# Patient Record
Sex: FEMALE | Race: WHITE | NOT HISPANIC OR LATINO | Employment: FULL TIME | URBAN - METROPOLITAN AREA
[De-identification: names, ages, dates, MRNs, and addresses within clinical notes are randomized per-mention and may not be internally consistent; named-entity substitution may affect disease eponyms.]

---

## 2017-01-16 ENCOUNTER — ALLSCRIPTS OFFICE VISIT (OUTPATIENT)
Dept: OTHER | Facility: OTHER | Age: 22
End: 2017-01-16

## 2017-01-16 DIAGNOSIS — Z11.3 ENCOUNTER FOR SCREENING FOR INFECTIONS WITH PREDOMINANTLY SEXUAL MODE OF TRANSMISSION: ICD-10-CM

## 2017-07-17 ENCOUNTER — ALLSCRIPTS OFFICE VISIT (OUTPATIENT)
Dept: OTHER | Facility: OTHER | Age: 22
End: 2017-07-17

## 2018-01-13 NOTE — PROGRESS NOTES
Assessment    1  Encounter for preventive health examination (V70 0) (Z00 00)   2  Routine screening for STI (sexually transmitted infection) (V74 5) (Z11 3)    Plan  Routine screening for STI (sexually transmitted infection)    · (1) [address]; Status:Active; Requested [id number];    · (1) HEP C ANTIBODY; Status:Active; Requested [id number];    · (1) HIV AG/AB COMBO, 4TH GEN; [Do Not Release]; Status:Active; Requested  [id number];    · (1) RPR; Status:Active; Requested [id number];     Discussion/Summary  health maintenance visit Currently, she eats a healthy diet and has an inadequate exercise regimen  cervical cancer screening is not indicated Testing was done today for chlamydia, gonorrhea and HIV  Breast cancer screening: monthly self breast exam was advised  Colorectal cancer screening: colorectal cancer screening is not indicated  Osteoporosis screening: bone mineral density testing is not indicated  Advice and education were given regarding nutrition and reproductive health  Patient discussion: discussed with the patient  Pt is to have STI testing and see GYN at school  She is also given the option to follow up here  The patent has the current Barriers: None  Patient is able to Self-Care  Educational resources provided:   Possible side effects of new medications were reviewed with the patient/guardian today  The treatment plan was reviewed with the patient/guardian  The patient/guardian understands and agrees with the treatment plan   The patient was counseled regarding instructions for management, risk factor reductions, patient and family education, impressions, risks and benefits of treatment options, importance of compliance with treatment  Self Referrals: No      Chief Complaint  Pt  in office today for a check up  History of Present Illness  HM, Adult Female: The patient is being seen for a health maintenance evaluation     Social History: Household members include 2 son(s) and father  She is unmarried  Work status: student  The patient has never smoked cigarettes  She reports occasional alcohol use  She has never used illicit drugs  General Health: The patient's health since the last visit is described as good  She has regular dental visits  She complains of vision problems  Vision care includes wearing glasses and having regular eye examinations  She denies hearing loss  Lifestyle:  She consumes a diverse and healthy diet  She does not have any weight concerns  She does not exercise regularly  She does not use tobacco  She consumes alcohol  She denies drug use  Reproductive health: the patient is premenopausal   she reports abnormal menses  Menstrual history: LMP: the last menstrual period was 12/28/16  Recent menstrual periods: bleeding has been normal  The cycles have been regular  The duration of her recent periods has been regular  Screening:   HPI: Pt is here for a check up  She is sexually active and would like STI testing  She needs an exam and wants to start some form of birth control  She is leaving for school tomorrow  Review of Systems    Constitutional: No fever, no chills, feels well, no tiredness, no recent weight gain or weight loss  Eyes: No complaints of eye pain, no red eyes, no eyesight problems, no discharge, no dry eyes, no itching of eyes  ENT: no complaints of earache, no loss of hearing, no nose bleeds, no nasal discharge, no sore throat, no hoarseness  Cardiovascular: No complaints of slow heart rate, no fast heart rate, no chest pain, no palpitations, no leg claudication, no lower extremity edema  Respiratory: No complaints of shortness of breath, no wheezing, no cough, no SOB on exertion, no orthopnea, no PND  Gastrointestinal: No complaints of abdominal pain, no constipation, no nausea or vomiting, no diarrhea, no bloody stools     Genitourinary: No complaints of dysuria, no incontinence, no pelvic pain, no dysmenorrhea, no vaginal discharge or bleeding  Musculoskeletal: No complaints of arthralgias, no myalgias, no joint swelling or stiffness, no limb pain or swelling  Integumentary: No complaints of skin rash or lesions, no itching, no skin wounds, no breast pain or lump  Neurological: No complaints of headache, no confusion, no convulsions, no numbness, no dizziness or fainting, no tingling, no limb weakness, no difficulty walking  Psychiatric: Not suicidal, no sleep disturbance, no anxiety or depression, no change in personality, no emotional problems  Endocrine: No complaints of proptosis, no hot flashes, no muscle weakness, no deepening of the voice, no feelings of weakness  Hematologic/Lymphatic: No complaints of swollen glands, no swollen glands in the neck, does not bleed easily, does not bruise easily  ROS reviewed  Active Problems    1  Acne (706 1) (L70 9)   2  Cerumen impaction (380 4) (H61 20)   3  Dermatitis, contact (692 9) (L25 9)   4  's permit physical examination (V70 3) (Z02 4)   5   Need for diphtheria-tetanus-pertussis (Tdap) vaccine, adult/adolescent (V06 1) (Z23)    Past Medical History    · History of Concussion without loss of consciousness (850 0) (S06 0X0A)   · History of Dermatophytosis (110 9) (B35 9)   · History of Open scalp wound (873 0) (S01 00XA)   · History of Pneumonia (486) (J18 9)    Surgical History    · Denied: History Of Prior Surgery    Family History  Sister    · FHx: allergies (V19 6) (Z84 89)  Maternal Grandmother    · Family history of Hyperthyroidism  Paternal Grandmother    · Family history of Hypothyroidism  Paternal Grandfather    · Family history of CAD (coronary artery disease)   · Family history of S/P CABG x 4  Maternal Aunt    · Family history of Breast cancer  Maternal Uncle    · Family history of Bipolar disorder    Social History    · Denied: History of Alcohol use   · Denied: History of Drug use   · Never a smoker   · Never smoked    Current Meds   1  No Reported Medications Recorded    Allergies    1  No Known Drug Allergies    Vitals   Recorded: 11NPC7864 06:28PM   Heart Rate 86   Systolic 295   Diastolic 84   Height 5 ft 4 in   Weight 154 lb    BMI Calculated 26 43   BSA Calculated 1 76   O2 Saturation 98     Physical Exam    Constitutional   General appearance: No acute distress, well appearing and well nourished  Eyes   Conjunctiva and lids: No swelling, erythema or discharge  Pupils and irises: Equal, round and reactive to light  Ears, Nose, Mouth, and Throat   External inspection of ears and nose: Normal     Otoscopic examination: Tympanic membranes translucent with normal light reflex  Canals patent without erythema  Oropharynx: Normal with no erythema, edema, exudate or lesions  Pulmonary   Respiratory effort: No increased work of breathing or signs of respiratory distress  Auscultation of lungs: Clear to auscultation  Cardiovascular   Palpation of heart: Normal PMI, no thrills  Auscultation of heart: Normal rate and rhythm, normal S1 and S2, without murmurs  Examination of extremities for edema and/or varicosities: Normal     Abdomen   Abdomen: Non-tender, no masses  Liver and spleen: No hepatomegaly or splenomegaly  Lymphatic   Palpation of lymph nodes in neck: No lymphadenopathy  Musculoskeletal   Gait and station: Normal     Digits and nails: Normal without clubbing or cyanosis  Inspection/palpation of joints, bones, and muscles: Normal     Skin   Skin and subcutaneous tissue: Normal without rashes or lesions  Neurologic   Cranial nerves: Cranial nerves 2-12 intact  Reflexes: 2+ and symmetric  Sensation: No sensory loss      Psychiatric   Orientation to person, place, and time: Normal     Mood and affect: Normal        Signatures   Electronically signed by : YEE Alejandro; Jan 16 2017  6:59PM EST                       (Author)    Electronically signed by : Jerome Caicedo ELI Amado ; Jan 16 2017  7:04PM EST

## 2018-01-14 VITALS
HEART RATE: 113 BPM | SYSTOLIC BLOOD PRESSURE: 120 MMHG | WEIGHT: 149 LBS | OXYGEN SATURATION: 98 % | DIASTOLIC BLOOD PRESSURE: 80 MMHG | BODY MASS INDEX: 25.44 KG/M2 | HEIGHT: 64 IN

## 2018-01-22 VITALS
HEART RATE: 86 BPM | HEIGHT: 64 IN | WEIGHT: 154 LBS | BODY MASS INDEX: 26.29 KG/M2 | DIASTOLIC BLOOD PRESSURE: 84 MMHG | OXYGEN SATURATION: 98 % | SYSTOLIC BLOOD PRESSURE: 122 MMHG

## 2019-07-09 ENCOUNTER — OFFICE VISIT (OUTPATIENT)
Dept: OBGYN CLINIC | Age: 24
End: 2019-07-09
Payer: COMMERCIAL

## 2019-07-09 VITALS
BODY MASS INDEX: 25.69 KG/M2 | WEIGHT: 145 LBS | DIASTOLIC BLOOD PRESSURE: 70 MMHG | SYSTOLIC BLOOD PRESSURE: 112 MMHG | HEIGHT: 63 IN

## 2019-07-09 DIAGNOSIS — Z11.3 SCREENING FOR STDS (SEXUALLY TRANSMITTED DISEASES): ICD-10-CM

## 2019-07-09 DIAGNOSIS — L70.0 ACNE VULGARIS: ICD-10-CM

## 2019-07-09 DIAGNOSIS — Z23 NEED FOR HPV VACCINATION: ICD-10-CM

## 2019-07-09 DIAGNOSIS — Z01.419 ENCOUNTER FOR GYNECOLOGICAL EXAMINATION WITHOUT ABNORMAL FINDING: Primary | ICD-10-CM

## 2019-07-09 PROCEDURE — G0145 SCR C/V CYTO,THINLAYER,RESCR: HCPCS | Performed by: NURSE PRACTITIONER

## 2019-07-09 PROCEDURE — 99385 PREV VISIT NEW AGE 18-39: CPT | Performed by: NURSE PRACTITIONER

## 2019-07-09 PROCEDURE — 87591 N.GONORRHOEAE DNA AMP PROB: CPT | Performed by: NURSE PRACTITIONER

## 2019-07-09 PROCEDURE — 87491 CHLMYD TRACH DNA AMP PROBE: CPT | Performed by: NURSE PRACTITIONER

## 2019-07-09 RX ORDER — TRAZODONE HYDROCHLORIDE 50 MG/1
TABLET ORAL
Refills: 0 | COMMUNITY
Start: 2019-07-01 | End: 2019-07-23 | Stop reason: SDUPTHER

## 2019-07-09 RX ORDER — SERTRALINE HYDROCHLORIDE 25 MG/1
25 TABLET, FILM COATED ORAL EVERY MORNING
Refills: 0 | COMMUNITY
Start: 2019-06-04 | End: 2019-07-23 | Stop reason: SDUPTHER

## 2019-07-09 NOTE — PATIENT INSTRUCTIONS
HPV (Human Papillomavirus) Vaccine for Adults   AMBULATORY CARE:   The human papillomavirus (HPV) vaccine  is an injection given to females and males to protect against human papillomavirus infection  HPV is most commonly spread through sexual activity  It can also be spread from a mother to her baby during delivery  The HPV vaccine is most effective if given before sexual activity begins  This allows your body to build almost complete protection against HPV before you have contact with the virus  The HPV vaccine is still effective through the age of 32 after sexual activity has begun  Who should get the HPV vaccine:   · Females 25 through 32years of age, and males 25 through 24years of age, who have not been vaccinated     · Men up to 32years of age who have sex with other men, and have not been vaccinated     · Anyone with a weak immune system, including infection with HIV, through 32years of age  How the vaccine is given:  The HPV vaccine can be given with other vaccinations  The vaccine is given in 3 doses to adults:  · The first dose  is given at any time  · The second dose  is given 1 to 2 months after the first dose  · The third dose  is given 6 months after the first dose  More information about how the vaccine is given: You may need 1 more dose of the HPV vaccine if any of the following is true:  · You only received 1 dose of the HPV vaccine before 13years of age    · You received 2 doses of the HPV vaccine less than 5 months apart before 13years of age  Who should not get the HPV vaccine or should wait to get it:  Do not get a second dose if you had a severe allergic reaction to the first dose  Do not get the vaccine if you are pregnant  If you are sick, wait to get the vaccine until symptoms go away  Risks of the HPV vaccine: You may have pain, redness, or swelling where the shot was given  You may have a fever or headache  You may have an allergic reaction to the vaccine   This can be life-threatening  Call 911 for any of the following:   · You have signs of a severe allergic reaction, such as trouble breathing, hives, or wheezing  Seek care immediately if:   · You have a high fever or behavior changes that concern you  Contact your healthcare provider if:   · You have questions or concerns about the HPV vaccine  Apply a warm compress  to the area to relieve swelling and pain  Follow up with your healthcare provider as directed:  Write down your questions so you remember to ask them during your visits  © 2017 [employer] Information is for End User's use only and may not be sold, redistributed or otherwise used for commercial purposes  All illustrations and images included in CareNotes® are the copyrighted property of [employer] M [employer] Inc  or [employer]  The above information is an  only  It is not intended as medical advice for individual conditions or treatments  Talk to your doctor, nurse or pharmacist before following any medical regimen to see if it is safe and effective for you

## 2019-07-09 NOTE — PROGRESS NOTES
Diagnoses and all orders for this visit:    Encounter for gynecological examination without abnormal finding  -     Liquid-based pap, screening    Screening for STDs (sexually transmitted diseases)  -     Chlamydia/GC amplified DNA by PCR    Need for HPV vaccination  -     Cancel: HPV VACCINE 9 VALENT IM    Acne vulgaris    Other orders  -     traZODone (DESYREL) 50 mg tablet  -     sertraline (ZOLOFT) 25 mg tablet; Take 25 mg by mouth every morning  -     sertraline (ZOLOFT) 50 mg tablet; Take 50 mg by mouth every morning          Calcium/vit d inclusion in the diet discussed, call with any issues, SBE reinforced, all concerns addressed  Pleasant 26 y o  premenopausal female here for annual exam  She denies any issues with bleeding or her menses  Reports regular cycles  First pap today  Denies vaginal issues  Denies pelvic pain  Discussed ocp for a BCM and to help with her acne  Risks, benefits and side effects were discussed  All questions were addressed  Gardasil series started today  Not Sexually active at present time but is concerned about STD exposure bc the condom broke a ferw weeks ago  Has seen blood when wiping after urination- will call if it happens again      Past Medical History:   Diagnosis Date    No known health problems      Past Surgical History:   Procedure Laterality Date    NO PAST SURGERIES       Family History   Problem Relation Age of Onset    Breast cancer Maternal Aunt     Colon cancer Neg Hx     Ovarian cancer Neg Hx     Uterine cancer Neg Hx     Cervical cancer Neg Hx      Social History     Tobacco Use    Smoking status: Never Smoker    Smokeless tobacco: Never Used   Substance Use Topics    Alcohol use: Yes     Frequency: Monthly or less    Drug use: Never       Current Outpatient Medications:     sertraline (ZOLOFT) 25 mg tablet, Take 25 mg by mouth every morning, Disp: , Rfl: 0    sertraline (ZOLOFT) 50 mg tablet, Take 50 mg by mouth every morning, Disp: , Rfl: 0    traZODone (DESYREL) 50 mg tablet, , Disp: , Rfl: 0  Patient Active Problem List    Diagnosis Date Noted    Encounter for gynecological examination without abnormal finding 2019    Need for HPV vaccination 2019    Acne vulgaris 2019       No Known Allergies    OB History    Para Term  AB Living   0 0 0 0 0 0   SAB TAB Ectopic Multiple Live Births   0 0 0 0 0     Customer service/call  for Elena  Vitals:    19 1431   BP: 112/70   BP Location: Right arm   Patient Position: Sitting   Weight: 65 8 kg (145 lb)   Height: 5' 2 5" (1 588 m)     Body mass index is 26 1 kg/m²  Review of Systems   Constitutional: Negative for chills, fatigue, fever and unexpected weight change  Respiratory: Negative for shortness of breath  Gastrointestinal: Negative for anal bleeding, blood in stool, constipation and diarrhea  Genitourinary: Negative for difficulty urinating, dysuria and hematuria  Physical Exam   Constitutional: She appears well-developed and well-nourished  No distress  HENT:   Head: Normocephalic  Neck: Normal range of motion  Neck supple  Pulmonary: Effort normal   Breasts: bilateral without masses, skin changes or nipple discharge  Bilaterally soft and warm to touch  No areas of erythema or pain  Abdominal: Soft  Pelvic exam was performed with patient supine  No labial fusion  There is no rash, tenderness, lesion or injury on the right labia  There is no rash, tenderness, lesion or injury on the left labia  Uterus is not deviated, not enlarged, not fixed and not tender  Cervix exhibits no motion tenderness, no discharge and no friability  Right adnexum displays no mass, no tenderness and no fullness  Left adnexum displays no mass, no tenderness and no fullness  No erythema or tenderness in the vagina  No foreign body in the vagina  No signs of injury around the vagina  No vaginal discharge found     Lymphadenopathy:        Right: No inguinal adenopathy present  Left: No inguinal adenopathy present

## 2019-07-12 LAB
C TRACH DNA SPEC QL NAA+PROBE: NEGATIVE
LAB AP GYN PRIMARY INTERPRETATION: NORMAL
Lab: NORMAL
N GONORRHOEA DNA SPEC QL NAA+PROBE: NEGATIVE

## 2019-07-21 ENCOUNTER — OFFICE VISIT (OUTPATIENT)
Dept: URGENT CARE | Facility: MEDICAL CENTER | Age: 24
End: 2019-07-21
Payer: COMMERCIAL

## 2019-07-21 VITALS
BODY MASS INDEX: 25.76 KG/M2 | TEMPERATURE: 97.9 F | SYSTOLIC BLOOD PRESSURE: 110 MMHG | RESPIRATION RATE: 18 BRPM | OXYGEN SATURATION: 98 % | WEIGHT: 145.4 LBS | DIASTOLIC BLOOD PRESSURE: 90 MMHG | HEART RATE: 82 BPM | HEIGHT: 63 IN

## 2019-07-21 DIAGNOSIS — H61.22 IMPACTED CERUMEN OF LEFT EAR: Primary | ICD-10-CM

## 2019-07-21 PROCEDURE — 99203 OFFICE O/P NEW LOW 30 MIN: CPT | Performed by: PHYSICIAN ASSISTANT

## 2019-07-21 PROCEDURE — G0382 LEV 3 HOSP TYPE B ED VISIT: HCPCS | Performed by: PHYSICIAN ASSISTANT

## 2019-07-21 PROCEDURE — 99283 EMERGENCY DEPT VISIT LOW MDM: CPT | Performed by: PHYSICIAN ASSISTANT

## 2019-07-21 NOTE — PATIENT INSTRUCTIONS
1  Motrin as needed for discomfort  2  Use Debrox or hydrogen peroxide flush weekly to prevent recurrent impaction  3   Follow up with PCP if symptoms re-occur or persist

## 2019-07-21 NOTE — PROGRESS NOTES
[facility]        NAME: Albaro Rivera is a 26 y o  female  : 1995    MRN: [id number]  DATE: 2019  TIME: 4:01 PM    Assessment and Plan   Impacted cerumen of left ear [H61 22]  1  Impacted cerumen of left ear           Patient Instructions     1  Motrin as needed for discomfort  2  Use Debrox or hydrogen peroxide flush weekly to prevent recurrent impaction  3  Follow up with PCP if symptoms re-occur or persist        Chief Complaint     Chief Complaint   Patient presents with    Ear Fullness     Pt  C/O fullness and ringing in her left ear  History of Present Illness       Santy Cheung is a 52-year-old female presents with a 1 day history of left-sided ear fullness  Patient denies any ear pain or drainage, she has noticed some decreased hearing from her left ear over the past 24 hours  Review of Systems   Review of Systems   Constitutional: Negative  HENT: Positive for hearing loss  Negative for ear discharge and ear pain  Respiratory: Negative  Gastrointestinal: Negative            Current Medications       Current Outpatient Medications:     sertraline (ZOLOFT) 25 mg tablet, Take 25 mg by mouth every morning, Disp: , Rfl: 0    sertraline (ZOLOFT) 50 mg tablet, Take 50 mg by mouth every morning, Disp: , Rfl: 0    traZODone (DESYREL) 50 mg tablet, , Disp: , Rfl: 0    Current Allergies     Allergies as of 2019    (No Known Allergies)            The following portions of the patient's history were reviewed and updated as appropriate: allergies, current medications, past family history, past medical history, past social history, past surgical history and problem list      Past Medical History:   Diagnosis Date    No known health problems     Pneumonia        Past Surgical History:   Procedure Laterality Date    NO PAST SURGERIES         Family History   Problem Relation Age of Onset    Breast cancer Maternal Aunt     Allergies Sister     Hyperthyroidism Maternal Grandmother     Hyperthyroidism Paternal Grandmother     Other Paternal Grandmother         CABG x4    Coronary artery disease Paternal Grandfather     Bipolar disorder Maternal Uncle     Colon cancer Neg Hx     Ovarian cancer Neg Hx     Uterine cancer Neg Hx     Cervical cancer Neg Hx          Medications have been verified  Objective   /90   Pulse 82   Temp 97 9 °F (36 6 °C) (Temporal)   Resp 18   Ht 5' 3" (1 6 m)   Wt 66 kg (145 lb 6 4 oz)   LMP 06/23/2019   SpO2 98%   BMI 25 76 kg/m²        Physical Exam     Physical Exam   Constitutional: She appears well-developed and well-nourished  No distress  HENT:   Head: Normocephalic and atraumatic  Right Ear: Tympanic membrane and ear canal normal    Ears:    Nose: Nose normal    Mouth/Throat: Uvula is midline, oropharynx is clear and moist and mucous membranes are normal    Cardiovascular: Normal rate, regular rhythm and normal heart sounds  No murmur heard    Pulmonary/Chest: Effort normal and breath sounds normal

## 2019-07-23 ENCOUNTER — OFFICE VISIT (OUTPATIENT)
Dept: FAMILY MEDICINE CLINIC | Facility: CLINIC | Age: 24
End: 2019-07-23
Payer: COMMERCIAL

## 2019-07-23 VITALS
HEIGHT: 64 IN | OXYGEN SATURATION: 97 % | WEIGHT: 144 LBS | HEART RATE: 74 BPM | TEMPERATURE: 98.7 F | DIASTOLIC BLOOD PRESSURE: 76 MMHG | SYSTOLIC BLOOD PRESSURE: 104 MMHG | BODY MASS INDEX: 24.59 KG/M2

## 2019-07-23 DIAGNOSIS — Z00.00 ANNUAL PHYSICAL EXAM: Primary | ICD-10-CM

## 2019-07-23 DIAGNOSIS — L70.0 ACNE VULGARIS: ICD-10-CM

## 2019-07-23 DIAGNOSIS — F32.4 MAJOR DEPRESSIVE DISORDER WITH SINGLE EPISODE, IN PARTIAL REMISSION (HCC): ICD-10-CM

## 2019-07-23 PROCEDURE — 99395 PREV VISIT EST AGE 18-39: CPT | Performed by: PHYSICIAN ASSISTANT

## 2019-07-23 RX ORDER — CLINDAMYCIN PHOSPHATE 10 MG/G
GEL TOPICAL 2 TIMES DAILY
Qty: 60 G | Refills: 5 | Status: SHIPPED | OUTPATIENT
Start: 2019-07-23 | End: 2020-04-08 | Stop reason: SDUPTHER

## 2019-07-23 RX ORDER — TRAZODONE HYDROCHLORIDE 50 MG/1
50 TABLET ORAL
Qty: 30 TABLET | Refills: 5 | Status: SHIPPED | OUTPATIENT
Start: 2019-07-23 | End: 2020-01-30

## 2019-07-23 RX ORDER — SERTRALINE HYDROCHLORIDE 25 MG/1
25 TABLET, FILM COATED ORAL DAILY
Qty: 30 TABLET | Refills: 5 | Status: SHIPPED | OUTPATIENT
Start: 2019-07-23 | End: 2020-02-14 | Stop reason: SDUPTHER

## 2019-07-23 NOTE — PATIENT INSTRUCTIONS

## 2019-07-23 NOTE — PROGRESS NOTES
ADULT ANNUAL PHYSICAL  [facility] - [address] [facility] [address]    NAME: Prasanna Sultana  AGE: 26 y o  SEX: female  : 1995     DATE: 2019     Assessment and Plan:     Problem List Items Addressed This Visit        Musculoskeletal and Integument    Acne vulgaris    Relevant Medications    clindamycin (CLINDAGEL) 1 % gel       Other    Annual physical exam    Major depressive disorder with single episode, in partial remission (HCC) - Primary    Relevant Medications    sertraline (ZOLOFT) 50 mg tablet    sertraline (ZOLOFT) 25 mg tablet    traZODone (DESYREL) 50 mg tablet    Other Relevant Orders    Ambulatory referral to [facility]  and preventive care screenings were discussed with patient today  Appropriate education was printed on patient's after visit summary  Counseling:  · Injury prevention: discussed safety/seat belts, safety helmets, smoke detectors, carbon dioxide detectors, and smoking near bedding or upholstery  Return in about 6 months (around 2020) for Recheck  Chief Complaint:     Chief Complaint   Patient presents with    Physical Exam     annual, no complaint of pain    Medication Problem     patient would like to discuss possible continuation of trazodone      History of Present Illness:     Adult Annual Physical   Patient here for a comprehensive physical exam  The patient reports problems - depression and wants to continue counseling, acne  Diet and Physical Activity  · Diet/Nutrition: well balanced diet  · Exercise: walking        Depression Screening  PHQ-9 Depression Screening    PHQ-9:    Frequency of the following problems over the past two weeks:       Little interest or pleasure in doing things:  1 - several days  Feeling down, depressed, or hopeless:  2 - more than half the days  Trouble falling or staying asleep, or sleeping too much:  1 - several days  Feeling tired or having little energy:  3 - nearly every day  Poor appetite or overeating:  3 - nearly every day  Feeling bad about yourself - or that you are a failure or have let yourself or your family down:  3 - nearly every day  Trouble concentrating on things, such as reading the newspaper or watching television:  3 - nearly every day  Moving or speaking so slowly that other people could have noticed  Or the opposite - being so fidgety or restless that you have been moving around a lot more than usual:  3 - nearly every day  Thoughts that you would be better off dead, or of hurting yourself in some way:  0 - not at all  PHQ-2 Score:  3  PHQ-9 Score:  19       General Health  · Sleep: sleeps poorly  · Hearing: normal - bilateral   · Vision: goes for regular eye exams and wears glasses  · Dental: no dental visits for >1 year  /GYN Health  · Last menstrual period: 07/21/19  · Contraceptive method: abstinence  · History of STDs?: no      Review of Systems:     Review of Systems   Constitutional: Negative for appetite change, fatigue, fever and unexpected weight change  HENT: Negative for dental problem, ear pain, hearing loss, mouth sores, nosebleeds, rhinorrhea, tinnitus, trouble swallowing and voice change  Eyes: Negative for photophobia, pain, discharge and visual disturbance  Respiratory: Negative for cough, chest tightness, shortness of breath and wheezing  Cardiovascular: Negative for chest pain and palpitations  Gastrointestinal: Negative for abdominal pain, constipation, diarrhea, nausea, rectal pain and vomiting  Endocrine: Negative for cold intolerance, polydipsia, polyphagia and polyuria  Genitourinary: Negative for decreased urine volume, difficulty urinating, dysuria, frequency and urgency  Musculoskeletal: Positive for back pain  Negative for arthralgias, gait problem, joint swelling, myalgias, neck pain and neck stiffness  Skin: Negative for color change and rash          acne Allergic/Immunologic: Negative for environmental allergies, food allergies and immunocompromised state  Neurological: Negative for dizziness, seizures, speech difficulty, light-headedness and headaches  Hematological: Negative for adenopathy  Does not bruise/bleed easily  Psychiatric/Behavioral: Positive for dysphoric mood and sleep disturbance  Negative for behavioral problems, confusion, decreased concentration and self-injury  The patient is nervous/anxious  The patient is not hyperactive         Past Medical History:     Past Medical History:   Diagnosis Date    No known health problems     Pneumonia       Past Surgical History:     Past Surgical History:   Procedure Laterality Date    NO PAST SURGERIES        Social History:     Social History     Socioeconomic History    Marital status: Single     Spouse name: None    Number of children: None    Years of education: None    Highest education level: None   Occupational History    None   Social Needs    Financial resource strain: None    Food insecurity:     Worry: None     Inability: None    Transportation needs:     Medical: None     Non-medical: None   Tobacco Use    Smoking status: Never Smoker    Smokeless tobacco: Never Used   Substance and Sexual Activity    Alcohol use: Yes     Frequency: Monthly or less    Drug use: Never    Sexual activity: Not Currently     Birth control/protection: None   Lifestyle    Physical activity:     Days per week: None     Minutes per session: None    Stress: None   Relationships    Social connections:     Talks on phone: None     Gets together: None     Attends Yarsanism service: None     Active member of club or organization: None     Attends meetings of clubs or organizations: None     Relationship status: None    Intimate partner violence:     Fear of current or ex partner: None     Emotionally abused: None     Physically abused: None     Forced sexual activity: None   Other Topics Concern    None   Social History Narrative    None      Family History:     Family History   Problem Relation Age of Onset    Breast cancer Maternal Aunt     Allergies Sister     Hyperthyroidism Maternal Grandmother     Hyperthyroidism Paternal Grandmother     Other Paternal Grandmother         CABG x4    Coronary artery disease Paternal Grandfather     Bipolar disorder Maternal Uncle     Colon cancer Neg Hx     Ovarian cancer Neg Hx     Uterine cancer Neg Hx     Cervical cancer Neg Hx       Current Medications:     Current Outpatient Medications   Medication Sig Dispense Refill    sertraline (ZOLOFT) 25 mg tablet Take 1 tablet (25 mg total) by mouth daily 30 tablet 5    sertraline (ZOLOFT) 50 mg tablet Take 1 tablet (50 mg total) by mouth every morning 30 tablet 5    traZODone (DESYREL) 50 mg tablet Take 1 tablet (50 mg total) by mouth daily at bedtime 30 tablet 5    clindamycin (CLINDAGEL) 1 % gel Apply topically 2 (two) times a day 60 g 5     No current facility-administered medications for this visit  Allergies:     No Known Allergies   Physical Exam:     /76   Pulse 74   Temp 98 7 °F (37 1 °C)   Ht 5' 4" (1 626 m)   Wt 65 3 kg (144 lb)   LMP 06/23/2019   SpO2 97%   BMI 24 72 kg/m²     Physical Exam   Constitutional: She is oriented to person, place, and time  She appears well-developed and well-nourished  HENT:   Head: Normocephalic  Right Ear: Hearing, tympanic membrane, external ear and ear canal normal    Left Ear: Hearing, tympanic membrane, external ear and ear canal normal    Nose: Nose normal    Mouth/Throat: Uvula is midline, oropharynx is clear and moist and mucous membranes are normal    Eyes: Pupils are equal, round, and reactive to light  Conjunctivae and EOM are normal    Neck: Normal range of motion  No thyromegaly present  Cardiovascular: Normal rate, regular rhythm, normal heart sounds and intact distal pulses     Pulmonary/Chest: Effort normal and breath sounds normal    Abdominal: Soft  Bowel sounds are normal  She exhibits no mass  There is no tenderness  Musculoskeletal: Normal range of motion  She exhibits no edema or deformity  Arms:  Lymphadenopathy:     She has no cervical adenopathy  Neurological: She is alert and oriented to person, place, and time  She has normal reflexes  Skin: Skin is dry  Facial acne with some nodular lesions noted and several pustular lesion   Psychiatric: She has a normal mood and affect  Her behavior is normal  Judgment and thought content normal    Nursing note and vitals reviewed        TOM [address]

## 2019-07-30 ENCOUNTER — CLINICAL SUPPORT (OUTPATIENT)
Dept: OBGYN CLINIC | Age: 24
End: 2019-07-30
Payer: COMMERCIAL

## 2019-07-30 VITALS — BODY MASS INDEX: 24.55 KG/M2 | WEIGHT: 143 LBS | SYSTOLIC BLOOD PRESSURE: 110 MMHG | DIASTOLIC BLOOD PRESSURE: 62 MMHG

## 2019-07-30 DIAGNOSIS — Z30.011 ENCOUNTER FOR INITIAL PRESCRIPTION OF CONTRACEPTIVE PILLS: Primary | ICD-10-CM

## 2019-07-30 DIAGNOSIS — Z23 NEED FOR HPV VACCINATION: ICD-10-CM

## 2019-07-30 PROCEDURE — 90651 9VHPV VACCINE 2/3 DOSE IM: CPT | Performed by: NURSE PRACTITIONER

## 2019-07-30 PROCEDURE — 90471 IMMUNIZATION ADMIN: CPT | Performed by: NURSE PRACTITIONER

## 2019-07-30 PROCEDURE — 99213 OFFICE O/P EST LOW 20 MIN: CPT | Performed by: NURSE PRACTITIONER

## 2019-07-30 RX ORDER — NORGESTIMATE AND ETHINYL ESTRADIOL 7DAYSX3 LO
1 KIT ORAL DAILY
Qty: 28 TABLET | Refills: 2 | Status: SHIPPED | OUTPATIENT
Start: 2019-07-30 | End: 2019-10-16 | Stop reason: SDUPTHER

## 2019-07-30 NOTE — PATIENT INSTRUCTIONS
Birth Control Pills   WHAT YOU NEED TO KNOW:   What are birth control pills? Birth control pills are also called oral contraceptives, or the pill  It is medicine that helps prevent pregnancy  Birth control pills work by preventing ovulation  Ovulation is when the ovaries make and release an egg cell each month  If this egg gets fertilized by sperm, pregnancy occurs  Birth control pills may also help to prevent pregnancy by keeping sperm from fertilizing an egg  What may be done before I can start taking birth control pills? You need to see your healthcare provider to get a prescription  Any of the following may be done before your healthcare provider gives you a prescription:  · Your healthcare provider will ask you about diseases and illnesses you have had in the past  He will check your risk for blood clots, heart conditions, or stroke  He will also check your blood pressure, and may do a breast and pelvic exam  A Pap smear may also be done during the pelvic exam  This is a test to make sure you do not have abnormal changes on your cervix  You may need other tests, such as a urine test, to make sure you are not pregnant  · Your healthcare provider will ask if you take any medicines and if you smoke  Smoking increases your risk for stroke, heart attack, or a blood clot in your lungs  If you smoke, you should not take certain kinds of birth control pills  What are the advantages of birth control pills? When birth control pills are used correctly, the chances of getting pregnant are very low  Birth control pills may help decrease bleeding and pain during your monthly period  They may also help prevent cancer of the uterus and ovaries  What are the disadvantages of birth control pills? You may have sudden changes in your mood or feelings while you take birth control pills  You may have nausea and decreased sex drive  You may have an increased appetite and rapid weight gain   You may also have bleeding in between periods, less frequent periods, vaginal dryness, and breast pain  Birth control pills will not protect you from sexually transmitted infections  Rarely, some birth control pills can increase your risk for a blood clot  This may become life-threatening  What should I do if I decide I want to get pregnant? If you are planning to have a baby, ask your healthcare provider when you may stop taking your birth control pills  It may take some time for you to start ovulating again  Ask your healthcare provider for more information about pregnancy after birth control pills  When should I start taking birth control pills after I have a baby? If you are not breastfeeding, you may start taking birth control pills 3 weeks after you give birth  You may be able to take certain types of birth control pills if you are breastfeeding  These pills can be started from 6 weeks to 6 months after you give birth  Ask your healthcare provider for more information about when to start taking birth control pills after you give birth  When should I contact my healthcare provider? · You have forgotten to take a birth control pill  · You have mood changes, such as depression, since starting birth control pills  · You have nausea or you are vomiting  · You have severe abdominal pain  · You missed a period and have questions or concerns about being pregnant  · You still have bleeding 4 months after taking birth control pills correctly  · You have questions or concerns about your condition or care  When should I seek immediate care or call 911? · Your arm or leg feels warm, tender, and painful  It may look swollen and red  · You feel lightheaded, short of breath, and have chest pain  · You cough up blood      · You have any of the following signs of a stroke:      ¨ Numbness or drooping on one side of your face     ¨ Weakness in an arm or leg    ¨ Confusion or difficulty speaking    ¨ Dizziness, a severe headache, or vision loss    · You have severe pain, numbness, or swelling in your arms or legs  CARE AGREEMENT:   You have the right to help plan your care  Learn about your health condition and how it may be treated  Discuss treatment options with your caregivers to decide what care you want to receive  You always have the right to refuse treatment  The above information is an  only  It is not intended as medical advice for individual conditions or treatments  Talk to your doctor, nurse or pharmacist before following any medical regimen to see if it is safe and effective for you  © 2017 [address] [facility] Information is for End User's use only and may not be sold, redistributed or otherwise used for commercial purposes  All illustrations and images included in CareNotes® are the copyrighted property of A D A M , Inc  or Panda Kinney

## 2019-07-30 NOTE — PROGRESS NOTES
Assessment/Plan:      Diagnoses and all orders for this visit:    Encounter for initial prescription of contraceptive pills    Need for HPV vaccination  -     HPV VACCINE 9 VALENT IM        Given ocp handout, discussed ACHES  All questions answered  Subjective:     Patient ID: Rne Villegas is a 26 y o  female  Pt was here for her first gardasil shot and asked to speak to me bc she would like to start ocp  Also has acne and mood issues  Review of Systems   Constitutional: Negative for activity change, chills, fatigue, fever and unexpected weight change  HENT: Negative for mouth sores and trouble swallowing  Respiratory: Negative for shortness of breath  Gastrointestinal: Negative for anal bleeding, blood in stool, constipation and diarrhea  Genitourinary: Negative for difficulty urinating, dysuria, genital sores and hematuria  Neurological: Negative for weakness  Psychiatric/Behavioral: Negative for confusion and self-injury  Objective:     Physical Exam   Constitutional: She is oriented to person, place, and time  She appears well-developed and well-nourished  No distress  HENT:   Head: Normocephalic  Eyes: EOM are normal    Neck: Normal range of motion  Pulmonary/Chest: Effort normal  No respiratory distress  Musculoskeletal: Normal range of motion  Neurological: She is alert and oriented to person, place, and time  Skin: Skin is warm and dry  Psychiatric: She has a normal mood and affect 
Patient here for first Gardasil vaccine  Given in left deltoid , tolerated well   Lot# [id number] Exp-09/20/21
DISCHARGE

## 2019-08-13 ENCOUNTER — TELEPHONE (OUTPATIENT)
Dept: OTHER | Facility: OTHER | Age: 24
End: 2019-08-13

## 2019-08-14 NOTE — TELEPHONE ENCOUNTER
If patient wants to decrease to 50 mg she needs to be aware she may feel dizzy and have some headaches  This should get better in a week or so after decreasing dose

## 2019-08-14 NOTE — TELEPHONE ENCOUNTER
ROBERT  Spoke with pt and she had difficulty sleeping but is now feeling better  Pt stated that she is having difficulty falling asleep and staying sleep    [phone]

## 2019-08-14 NOTE — TELEPHONE ENCOUNTER
Shari Horton 1995  CONFIDENTIALTY NOTICE: This fax transmission is intended only for the addressee  It contains information that is legally privileged,  confidential or otherwise protected from use or disclosure  If you are not the intended recipient, you are strictly prohibited from reviewing,  disclosing, copying using or disseminating any of this information or taking any action in reliance on or regarding this information  If you have  received this fax in error, please notify us immediately by telephone so that we can arrange for its return to us  Page:   Call Id: [id number]  Health Call  Standard Call Report  Health Call  Patient Name: Shari Horton  Gender: Female  : 1995  Age: 26 Y 6 M 6 D  Return Phone  Number: [phone] (Current)  Address: [address] [place] [address]/State/Zip: [address] [place] [address] Drive [address]  Practice Name: [address] [place] [address] Charged:  Physician:  Thea Mcbride Name:  Relationship To  Patient: Self  Return Phone Number: [phone] (Current)  Presenting Problem: "I took Zoloft 50 mg this morning and  I am now feeling side effects  I want to  know what to do "  Service Type: Triage  Charged Service 1: Messages  Pharmacy Name and  Number:  Nurse Assessment  Protocols  Protocol Title Nurse Date/Time  Disp  Time Disposition Final User  2019 10:34:42 PM Send to Follow Up Missael Abreu RN, Fuad Barton  2019 10:58:29 PM Close Yes Socrates Moy RN, Fuad Barton  Comments  User: Nimco Wilde RN Date/Time: 2019 10:34:32 PM  Attempted to call patient  Rings once and then goes to   Left message for patient to call back  I will attempt a second call  back within 10 - 15 minutes  User: Nimco Wilde RN Date/Time: 2019 10:58:21 PM  Second attempt to reach patient  No answer  Left  for pt  to call back for care advice  Closing chart at this time

## 2019-08-16 NOTE — TELEPHONE ENCOUNTER
Left message to follow up on patient's complaint of insomnia and to inform her of physician assistants recommendations regarding medication change

## 2019-09-06 ENCOUNTER — TELEPHONE (OUTPATIENT)
Dept: OBGYN CLINIC | Facility: CLINIC | Age: 24
End: 2019-09-06

## 2019-09-06 DIAGNOSIS — B37.9 YEAST INFECTION: Primary | ICD-10-CM

## 2019-09-06 RX ORDER — FLUCONAZOLE 150 MG/1
TABLET ORAL
Qty: 2 TABLET | Refills: 0 | OUTPATIENT
Start: 2019-09-06 | End: 2019-09-09

## 2019-09-06 NOTE — TELEPHONE ENCOUNTER
pls call in script for diflucan x 2 (per protocol) and she should come in if not improved after med   Thx

## 2019-09-06 NOTE — TELEPHONE ENCOUNTER
Pt states started Monday, used Monistat and it did help a little bit  Last night it hurt and burned a lot  Watery discharge, creamy discharge beginning of the week, no odor

## 2019-09-06 NOTE — TELEPHONE ENCOUNTER
Last annual 07/2019  returned pt call, pt stated she is having clear discharge not white due to that stopped after she used the monistat  Pt sx now are internal burning with clear discharge with no odor  Please advise

## 2019-09-19 ENCOUNTER — OFFICE VISIT (OUTPATIENT)
Dept: URGENT CARE | Facility: MEDICAL CENTER | Age: 24
End: 2019-09-19
Payer: COMMERCIAL

## 2019-09-19 VITALS
BODY MASS INDEX: 24.8 KG/M2 | WEIGHT: 140 LBS | DIASTOLIC BLOOD PRESSURE: 82 MMHG | OXYGEN SATURATION: 99 % | SYSTOLIC BLOOD PRESSURE: 120 MMHG | HEIGHT: 63 IN | TEMPERATURE: 98.7 F | HEART RATE: 96 BPM

## 2019-09-19 DIAGNOSIS — J06.9 UPPER RESPIRATORY TRACT INFECTION, UNSPECIFIED TYPE: Primary | ICD-10-CM

## 2019-09-19 PROCEDURE — G0382 LEV 3 HOSP TYPE B ED VISIT: HCPCS | Performed by: PHYSICIAN ASSISTANT

## 2019-09-19 PROCEDURE — 99283 EMERGENCY DEPT VISIT LOW MDM: CPT | Performed by: PHYSICIAN ASSISTANT

## 2019-09-19 PROCEDURE — 99213 OFFICE O/P EST LOW 20 MIN: CPT | Performed by: PHYSICIAN ASSISTANT

## 2019-09-19 RX ORDER — BENZONATATE 200 MG/1
200 CAPSULE ORAL 3 TIMES DAILY PRN
Qty: 20 CAPSULE | Refills: 0 | Status: SHIPPED | OUTPATIENT
Start: 2019-09-19 | End: 2020-02-14 | Stop reason: ALTCHOICE

## 2019-09-19 NOTE — PROGRESS NOTES
[facility]        NAME: Ciarra Gates is a 26 y o  female  : 1995    MRN: [id number]  DATE: 2019  TIME: 11:36 AM    Assessment and Plan   Upper respiratory tract infection, unspecified type [J06 9]  1  Upper respiratory tract infection, unspecified type  benzonatate (TESSALON) 200 MG capsule         Patient Instructions     1  Increase fluids  2  Motrin as needed for headache/sinus pressure  3  Take Tessalon 200mg  Every 8 hours as needed for cough  4  Follow up with PCP in 3-5 days if symptoms persist or worsen  Chief Complaint     Chief Complaint   Patient presents with    Sore Throat     Started x5 days ago with a sore throat and difficulty swallowing    Cough     Productive cough and wheezing/sob at times, denies fevers    Dizziness    Nasal Congestion         History of Present Illness       Robb Tsang is a 25-year-old female presents with a 5 day history of nasal discharge, cough, congestion and postnasal drip  Patient denies any fever but has had chills and body aches since the onset of symptoms  Review of Systems   Review of Systems   Constitutional: Positive for chills and fatigue  Negative for fever  HENT: Positive for congestion, postnasal drip, rhinorrhea, sneezing and sore throat  Respiratory: Positive for cough  Gastrointestinal: Negative            Current Medications       Current Outpatient Medications:     norgestimate-ethinyl estradiol (ORTHO TRI-CYCLEN LO) 0 18/0 215/0 25 MG-25 MCG per tablet, Take 1 tablet by mouth daily, Disp: 28 tablet, Rfl: 2    sertraline (ZOLOFT) 25 mg tablet, Take 1 tablet (25 mg total) by mouth daily, Disp: 30 tablet, Rfl: 5    sertraline (ZOLOFT) 50 mg tablet, Take 1 tablet (50 mg total) by mouth every morning, Disp: 30 tablet, Rfl: 5    traZODone (DESYREL) 50 mg tablet, Take 1 tablet (50 mg total) by mouth daily at bedtime, Disp: 30 tablet, Rfl: 5    benzonatate (TESSALON) 200 MG capsule, Take 1 capsule (200 mg total) by mouth 3 (three) times a day as needed for cough, Disp: 20 capsule, Rfl: 0    clindamycin (CLINDAGEL) 1 % gel, Apply topically 2 (two) times a day (Patient not taking: Reported on 9/19/2019), Disp: 60 g, Rfl: 5    Current Allergies     Allergies as of 09/19/2019    (No Known Allergies)            The following portions of the patient's history were reviewed and updated as appropriate: allergies, current medications, past family history, past medical history, past social history, past surgical history and problem list      Past Medical History:   Diagnosis Date    No known health problems     Pneumonia        Past Surgical History:   Procedure Laterality Date    NO PAST SURGERIES         Family History   Problem Relation Age of Onset    Breast cancer Maternal Aunt     Allergies Sister     Hyperthyroidism Maternal Grandmother     Hyperthyroidism Paternal Grandmother     Other Paternal Grandmother         CABG x4    Coronary artery disease Paternal Grandfather     Bipolar disorder Maternal Uncle     Colon cancer Neg Hx     Ovarian cancer Neg Hx     Uterine cancer Neg Hx     Cervical cancer Neg Hx          Medications have been verified  Objective   /82   Pulse 96   Temp 98 7 °F (37 1 °C) (Temporal)   Ht 5' 3" (1 6 m)   Wt 63 5 kg (140 lb)   SpO2 99%   BMI 24 80 kg/m²        Physical Exam     Physical Exam   Constitutional: She appears well-developed and well-nourished  No distress  HENT:   Head: Normocephalic and atraumatic  Right Ear: Tympanic membrane and ear canal normal    Left Ear: Tympanic membrane and ear canal normal    Nose: Mucosal edema and rhinorrhea present  Mouth/Throat: Uvula is midline, oropharynx is clear and moist and mucous membranes are normal    Cardiovascular: Normal rate, regular rhythm and normal heart sounds  No murmur heard    Pulmonary/Chest: Effort normal and breath sounds normal

## 2019-09-19 NOTE — PATIENT INSTRUCTIONS
1  Increase fluids  2  Motrin as needed for headache/sinus pressure  3  Take Tessalon 200mg  Every 8 hours as needed for cough  4  Follow up with PCP in 3-5 days if symptoms persist or worsen

## 2019-10-14 ENCOUNTER — CLINICAL SUPPORT (OUTPATIENT)
Dept: OBGYN CLINIC | Age: 24
End: 2019-10-14
Payer: COMMERCIAL

## 2019-10-14 VITALS — DIASTOLIC BLOOD PRESSURE: 76 MMHG | SYSTOLIC BLOOD PRESSURE: 110 MMHG | WEIGHT: 146.8 LBS | BODY MASS INDEX: 26 KG/M2

## 2019-10-14 DIAGNOSIS — Z23 NEED FOR HPV VACCINATION: Primary | ICD-10-CM

## 2019-10-14 PROCEDURE — 90651 9VHPV VACCINE 2/3 DOSE IM: CPT

## 2019-10-14 PROCEDURE — 90471 IMMUNIZATION ADMIN: CPT

## 2019-10-14 NOTE — PROGRESS NOTES
Patient is here for [facility] #2, administered on right gluteus  Patient tolerated injection well

## 2019-10-14 NOTE — LETTER
October 14, 2019     Patient: Zac Cervantes   YOB: 1995   Date of Visit: 10/14/2019       To Whom it May Concern:    Zac Cervantes is under my professional care  She was seen in my office on 10/14/2019  She may return to work today  If you have any questions or concerns, please don't hesitate to call           Sincerely,          [address]

## 2019-10-16 DIAGNOSIS — Z30.011 ENCOUNTER FOR INITIAL PRESCRIPTION OF CONTRACEPTIVE PILLS: ICD-10-CM

## 2019-10-16 RX ORDER — NORGESTIMATE AND ETHINYL ESTRADIOL 7DAYSX3 LO
KIT ORAL
Qty: 28 TABLET | Refills: 9 | Status: SHIPPED | OUTPATIENT
Start: 2019-10-16 | End: 2020-08-06 | Stop reason: SDUPTHER

## 2019-10-29 ENCOUNTER — OFFICE VISIT (OUTPATIENT)
Dept: OBGYN CLINIC | Age: 24
End: 2019-10-29
Payer: COMMERCIAL

## 2019-10-29 VITALS
SYSTOLIC BLOOD PRESSURE: 122 MMHG | DIASTOLIC BLOOD PRESSURE: 72 MMHG | BODY MASS INDEX: 24.75 KG/M2 | WEIGHT: 145 LBS | HEIGHT: 64 IN

## 2019-10-29 DIAGNOSIS — Z30.41 SURVEILLANCE OF CONTRACEPTIVE PILL: ICD-10-CM

## 2019-10-29 DIAGNOSIS — Z71.1 MENTAL HEALTH-RELATED COMPLAINT: ICD-10-CM

## 2019-10-29 DIAGNOSIS — Z11.3 SCREEN FOR STD (SEXUALLY TRANSMITTED DISEASE): ICD-10-CM

## 2019-10-29 DIAGNOSIS — L70.0 ACNE VULGARIS: Primary | ICD-10-CM

## 2019-10-29 PROCEDURE — 99213 OFFICE O/P EST LOW 20 MIN: CPT | Performed by: NURSE PRACTITIONER

## 2019-10-29 PROCEDURE — 87591 N.GONORRHOEAE DNA AMP PROB: CPT | Performed by: NURSE PRACTITIONER

## 2019-10-29 PROCEDURE — 87491 CHLMYD TRACH DNA AMP PROBE: CPT | Performed by: NURSE PRACTITIONER

## 2019-10-29 NOTE — PATIENT INSTRUCTIONS
Birth Control Pills   WHAT YOU NEED TO KNOW:   What are birth control pills? Birth control pills are also called oral contraceptives, or the pill  It is medicine that helps prevent pregnancy  Birth control pills work by preventing ovulation  Ovulation is when the ovaries make and release an egg cell each month  If this egg gets fertilized by sperm, pregnancy occurs  Birth control pills may also help to prevent pregnancy by keeping sperm from fertilizing an egg  What may be done before I can start taking birth control pills? You need to see your healthcare provider to get a prescription  Any of the following may be done before your healthcare provider gives you a prescription:  · Your healthcare provider will ask you about diseases and illnesses you have had in the past  He will check your risk for blood clots, heart conditions, or stroke  He will also check your blood pressure, and may do a breast and pelvic exam  A Pap smear may also be done during the pelvic exam  This is a test to make sure you do not have abnormal changes on your cervix  You may need other tests, such as a urine test, to make sure you are not pregnant  · Your healthcare provider will ask if you take any medicines and if you smoke  Smoking increases your risk for stroke, heart attack, or a blood clot in your lungs  If you smoke, you should not take certain kinds of birth control pills  What are the advantages of birth control pills? When birth control pills are used correctly, the chances of getting pregnant are very low  Birth control pills may help decrease bleeding and pain during your monthly period  They may also help prevent cancer of the uterus and ovaries  What are the disadvantages of birth control pills? You may have sudden changes in your mood or feelings while you take birth control pills  You may have nausea and decreased sex drive  You may have an increased appetite and rapid weight gain   You may also have bleeding in between periods, less frequent periods, vaginal dryness, and breast pain  Birth control pills will not protect you from sexually transmitted infections  Rarely, some birth control pills can increase your risk for a blood clot  This may become life-threatening  What should I do if I decide I want to get pregnant? If you are planning to have a baby, ask your healthcare provider when you may stop taking your birth control pills  It may take some time for you to start ovulating again  Ask your healthcare provider for more information about pregnancy after birth control pills  When should I start taking birth control pills after I have a baby? If you are not breastfeeding, you may start taking birth control pills 3 weeks after you give birth  You may be able to take certain types of birth control pills if you are breastfeeding  These pills can be started from 6 weeks to 6 months after you give birth  Ask your healthcare provider for more information about when to start taking birth control pills after you give birth  When should I contact my healthcare provider? · You have forgotten to take a birth control pill  · You have mood changes, such as depression, since starting birth control pills  · You have nausea or you are vomiting  · You have severe abdominal pain  · You missed a period and have questions or concerns about being pregnant  · You still have bleeding 4 months after taking birth control pills correctly  · You have questions or concerns about your condition or care  When should I seek immediate care or call 911? · Your arm or leg feels warm, tender, and painful  It may look swollen and red  · You feel lightheaded, short of breath, and have chest pain  · You cough up blood      · You have any of the following signs of a stroke:      ¨ Numbness or drooping on one side of your face     ¨ Weakness in an arm or leg    ¨ Confusion or difficulty speaking    ¨ Dizziness, a severe headache, or vision loss    · You have severe pain, numbness, or swelling in your arms or legs  CARE AGREEMENT:   You have the right to help plan your care  Learn about your health condition and how it may be treated  Discuss treatment options with your caregivers to decide what care you want to receive  You always have the right to refuse treatment  The above information is an  only  It is not intended as medical advice for individual conditions or treatments  Talk to your doctor, nurse or pharmacist before following any medical regimen to see if it is safe and effective for you  © 2017 [facility] Information is for End User's use only and may not be sold, redistributed or otherwise used for commercial purposes  All illustrations and images included in CareNotes® are the copyrighted property of A D A M , Inc  or Panda Kinney

## 2019-10-29 NOTE — PROGRESS NOTES
Diagnoses and all orders for this visit:    Screen for STD (sexually transmitted disease)  -     Chlamydia/GC amplified DNA by PCR  -     HIV 1/2 AG-AB combo; Future  -     RPR; Future  -     Hepatitis B surface antigen; Future  -     Hepatitis C antibody; Future    Mental health-related complaint  -     Ambulatory referral to Psychiatry; Future        Call if no symptom improvement, all questions answered, return for annual         Pleasant 26 y o  here for STD/POM testing  BROKE UP WITH HER BOYFRIEND LAST MONTH, STATES IT WAS A "BAD BREAKUP"  HAS HAD 3 RECENT NEW PARTNERS  DOING WELL ON OCP  GREAT IMPROVEMENT IN HER ACNE  Denies fever, pelvic pain or dyspareunia  Denies GI or  symptoms        Past Medical History:   Diagnosis Date    No known health problems     Pneumonia      Past Surgical History:   Procedure Laterality Date    NO PAST SURGERIES       Social History     Tobacco Use    Smoking status: Never Smoker    Smokeless tobacco: Never Used   Substance Use Topics    Alcohol use: Yes     Frequency: Monthly or less    Drug use: Never     Family History   Problem Relation Age of Onset    Breast cancer Maternal Aunt     Allergies Sister     Hyperthyroidism Maternal Grandmother     Hyperthyroidism Paternal Grandmother     Other Paternal Grandmother         CABG x4    Coronary artery disease Paternal Grandfather     Bipolar disorder Maternal Uncle     Colon cancer Neg Hx     Ovarian cancer Neg Hx     Uterine cancer Neg Hx     Cervical cancer Neg Hx        Current Outpatient Medications:     norgestimate-ethinyl estradiol (ORTHO TRI-CYCLEN LO) 0 18/0 215/0 25 MG-25 MCG per tablet, take 1 tablet by mouth once daily, Disp: 28 tablet, Rfl: 9    sertraline (ZOLOFT) 25 mg tablet, Take 1 tablet (25 mg total) by mouth daily, Disp: 30 tablet, Rfl: 5    sertraline (ZOLOFT) 50 mg tablet, Take 1 tablet (50 mg total) by mouth every morning, Disp: 30 tablet, Rfl: 5    traZODone (DESYREL) 50 mg tablet, Take 1 tablet (50 mg total) by mouth daily at bedtime, Disp: 30 tablet, Rfl: 5    benzonatate (TESSALON) 200 MG capsule, Take 1 capsule (200 mg total) by mouth 3 (three) times a day as needed for cough (Patient not taking: Reported on 10/29/2019), Disp: 20 capsule, Rfl: 0    clindamycin (CLINDAGEL) 1 % gel, Apply topically 2 (two) times a day (Patient not taking: Reported on 10/29/2019), Disp: 60 g, Rfl: 5    No Known Allergies  OB History    Para Term  AB Living   0 0 0 0 0 0   SAB TAB Ectopic Multiple Live Births   0 0 0 0 0       Vitals:    10/29/19 1401 10/29/19 1404   BP:  122/72   BP Location:  Right arm   Patient Position:  Sitting   Cuff Size:  Standard   Weight: 65 8 kg (145 lb)    Height: 5' 4" (1 626 m)      Body mass index is 24 89 kg/m²  Review of Systems   Constitutional: Negative for chills, fatigue, fever and unexpected weight change  Respiratory: Negative for shortness of breath  Gastrointestinal: Negative for anal bleeding, blood in stool, constipation and diarrhea  Genitourinary: Negative for difficulty urinating, dysuria and hematuria  Physical Exam   Constitutional: She appears well-developed and well-nourished  No distress  Alert and oriented  HENT: atraumatic  Head: Normocephalic  Neck: Normal range of motion  Neck supple  Pulmonary: Effort normal   Abdominal: Soft  Pelvic exam was performed with patient supine  No labial fusion  There is no rash, tenderness, lesion or injury on the right labia  There is no rash, tenderness, lesion or injury on the left labia  Urethral meatus does not show any tenderness, inflammation or discharge  Palpation of midline bladder without pain or discomfort  Uterus is not deviated, not enlarged, not fixed and not tender  Cervix exhibits no motion tenderness, no discharge and no friability  Right adnexum displays no mass, no tenderness and no fullness  Left adnexum displays no mass, no tenderness and no fullness   No erythema or tenderness in the vagina  No foreign body in the vagina  No signs of injury around the vagina  No vaginal discharge found  Perineum and anus without areas of injury  No lesions noted or swelling  Lymphadenopathy:        Right: No inguinal adenopathy present  Left: No inguinal adenopathy present

## 2019-10-29 NOTE — PROGRESS NOTES
Pt here for STD screening  Has had several partners in the past few months  Would like blood testing as well  She has no other GYN concerns but would like info on a counselor  Referral to [facility]

## 2019-10-30 LAB
C TRACH DNA SPEC QL NAA+PROBE: NEGATIVE
N GONORRHOEA DNA SPEC QL NAA+PROBE: NEGATIVE

## 2019-12-20 ENCOUNTER — TELEPHONE (OUTPATIENT)
Dept: PSYCHIATRY | Facility: CLINIC | Age: 24
End: 2019-12-20

## 2019-12-20 NOTE — TELEPHONE ENCOUNTER
Behavorial Health Outpatient Intake Questions    Referred by:    Please advised interviewee that they need to answer all questions truthfully to allow for best care and any misrepresentations of information may affect their ability to be seen at this clinic   => Was this discussed? Yes     Behavorial Health Outpatient Intake History -     Presenting Problem (in patient's words): has depression and anxiety suffering from trauma    Has the patient ever seen or is currently seeing a psychiatrist? No   If yes who/when? If seen as outpatient, have they been seen here (and by whom)? If not seen here, which provider(s) did the patient see and for how long? Has the patient ever seen or currently see a therapist? Yes If yes who/when? May of 2019 school counselor lavonne through [employer]    Has a member of the patient's family been in therapy here? No  If yes, with whom? Has the patient been hospitalized for mental health? No   If yes, how long ago was last hospitalization and where was it? Substance Abuse:No concerns of substance abuse are reported  Does the patient have ICM or CTT? No    Is the patient taking injectable psychiatric medications? no    => If yes, patient cannot be seen here  Communications  Are there any developmental disabilities? No    Does the patient have hearing impairment? No       History-    Has the patient served in the [place] [address]? No    If yes, have you had combat services? No    Was the patient activated into federal active duty as a member of the [employer] or reserve? No    Legal History-     Does the patient have any history of arrests, [facility]/USP time, or DUIs? No  If Yes-  1) What types of charges? 2) When were they last incarcerated? 3) Are they currently on parole or probation? Minor Child-    Who has custody of the child? Is there a custody agreement?      If there is a custody agreement remind parent that they must bring a copy to the first appt or they will not be seen  Intake Team, please check with provider before scheduling if flags come up such as:  - complex case  - legal history (other than DUI)  - communication barrier concerns are present  - if, in your judgment, this needs further review    ACCEPTED as a patient Yes  => Appointment Date: 1/31/20 with Duran Ching    Referred Elsewhere? Name of Insurance Co:  Insurance ID#  [employer] #  If ins is primary or secondary  If patient is a minor, parents information such as Name, D  O B of guarantor

## 2020-01-24 ENCOUNTER — TELEPHONE (OUTPATIENT)
Dept: FAMILY MEDICINE CLINIC | Facility: CLINIC | Age: 25
End: 2020-01-24

## 2020-01-24 NOTE — TELEPHONE ENCOUNTER
Pt needs to go to ER to be evaluated  -s/p subxdiphoid pericardial window placed on 5/7 by CT surgery while inpatient, however drain removed prior to discharge.   -TTE small pericardial effusion -s/p subxdiphoid pericardial window placed on 5/7 by CT surgery   -repeat TTE small pericardial effusion

## 2020-01-29 DIAGNOSIS — F32.4 MAJOR DEPRESSIVE DISORDER WITH SINGLE EPISODE, IN PARTIAL REMISSION (HCC): ICD-10-CM

## 2020-01-30 DIAGNOSIS — F32.4 MAJOR DEPRESSIVE DISORDER WITH SINGLE EPISODE, IN PARTIAL REMISSION (HCC): ICD-10-CM

## 2020-01-30 RX ORDER — TRAZODONE HYDROCHLORIDE 50 MG/1
TABLET ORAL
Qty: 30 TABLET | Refills: 1 | Status: SHIPPED | OUTPATIENT
Start: 2020-01-30 | End: 2020-03-28 | Stop reason: SDUPTHER

## 2020-02-07 ENCOUNTER — CLINICAL SUPPORT (OUTPATIENT)
Dept: OBGYN CLINIC | Facility: CLINIC | Age: 25
End: 2020-02-07
Payer: COMMERCIAL

## 2020-02-07 VITALS — DIASTOLIC BLOOD PRESSURE: 70 MMHG | SYSTOLIC BLOOD PRESSURE: 120 MMHG | BODY MASS INDEX: 25.92 KG/M2 | WEIGHT: 151 LBS

## 2020-02-07 DIAGNOSIS — Z23 NEED FOR HPV VACCINATION: Primary | ICD-10-CM

## 2020-02-07 PROCEDURE — 90651 9VHPV VACCINE 2/3 DOSE IM: CPT | Performed by: NURSE PRACTITIONER

## 2020-02-07 PROCEDURE — 90471 IMMUNIZATION ADMIN: CPT | Performed by: NURSE PRACTITIONER

## 2020-02-14 ENCOUNTER — OFFICE VISIT (OUTPATIENT)
Dept: FAMILY MEDICINE CLINIC | Facility: CLINIC | Age: 25
End: 2020-02-14
Payer: COMMERCIAL

## 2020-02-14 VITALS
BODY MASS INDEX: 25.23 KG/M2 | OXYGEN SATURATION: 98 % | HEART RATE: 80 BPM | WEIGHT: 147 LBS | DIASTOLIC BLOOD PRESSURE: 66 MMHG | SYSTOLIC BLOOD PRESSURE: 104 MMHG | TEMPERATURE: 98.3 F

## 2020-02-14 DIAGNOSIS — M25.561 ACUTE PAIN OF RIGHT KNEE: ICD-10-CM

## 2020-02-14 DIAGNOSIS — F32.4 MAJOR DEPRESSIVE DISORDER WITH SINGLE EPISODE, IN PARTIAL REMISSION (HCC): Primary | ICD-10-CM

## 2020-02-14 DIAGNOSIS — M54.50 ACUTE RIGHT-SIDED LOW BACK PAIN WITHOUT SCIATICA: ICD-10-CM

## 2020-02-14 DIAGNOSIS — Z23 NEED FOR INFLUENZA VACCINATION: ICD-10-CM

## 2020-02-14 PROCEDURE — 99214 OFFICE O/P EST MOD 30 MIN: CPT | Performed by: PHYSICIAN ASSISTANT

## 2020-02-14 PROCEDURE — 1036F TOBACCO NON-USER: CPT | Performed by: PHYSICIAN ASSISTANT

## 2020-02-14 PROCEDURE — 90471 IMMUNIZATION ADMIN: CPT

## 2020-02-14 PROCEDURE — 90682 RIV4 VACC RECOMBINANT DNA IM: CPT

## 2020-02-14 RX ORDER — SERTRALINE HYDROCHLORIDE 25 MG/1
25 TABLET, FILM COATED ORAL DAILY
Qty: 30 TABLET | Refills: 5 | Status: SHIPPED | OUTPATIENT
Start: 2020-02-14 | End: 2020-08-20

## 2020-02-14 NOTE — PROGRESS NOTES
Assessment/Plan:  BMI Counseling: Body mass index is 25 23 kg/m²  The BMI is above normal  Nutrition recommendations include decreasing portion sizes, encouraging healthy choices of fruits and vegetables, decreasing fast food intake, consuming healthier snacks, limiting drinks that contain sugar, moderation in carbohydrate intake, increasing intake of lean protein, reducing intake of saturated and trans fat and reducing intake of cholesterol  Exercise recommendations include moderate physical activity 150 minutes/week  No pharmacotherapy was ordered  Diagnoses and all orders for this visit:    Major depressive disorder with single episode, in partial remission (HCC)  -     sertraline (ZOLOFT) 25 mg tablet; Take 1 tablet (25 mg total) by mouth daily  -     sertraline (ZOLOFT) 50 mg tablet; Take 1 tablet (50 mg total) by mouth every morning  -     Ambulatory referral to [facility] Su[facility]; Future    Acute pain of right knee  -     US extremity soft tissue; Future  -     XR knee 3 vw right non injury; Future    Need for influenza vaccination  -     influenza vaccine, [id number], quadrivalent, recombinant, PF, 0 5 mL, for patients 18 yr+ (FLUBLOK)    Acute right-sided low back pain without sciatica        Patient is to take ibuprofen on a regular basis while she is working to help with her low back pain  She can also use over-the-counter pain patches applied to that area  Subjective:      Patient ID: Sukhdev Goode is a 22 y o  female  Depression   This is a chronic problem  The current episode started more than 1 year ago  The problem occurs every several days  The problem has been waxing and waning  Associated symptoms include arthralgias and fatigue  Pertinent negatives include no abdominal pain, anorexia, chest pain, chills, congestion, headaches, joint swelling, myalgias, nausea, numbness, rash or weakness  The symptoms are aggravated by stress  Treatments tried: medications as prescribed   The treatment provided moderate relief  Knee Pain    The incident occurred more than 1 week ago  The incident occurred at home  There was no injury mechanism  The pain is present in the right knee  The pain is moderate  The pain has been fluctuating since onset  Pertinent negatives include no inability to bear weight, loss of motion, loss of sensation, muscle weakness, numbness or tingling  She reports no foreign bodies present  The symptoms are aggravated by weight bearing and palpation  She has tried NSAIDs for the symptoms  The treatment provided mild relief  The following portions of the patient's history were reviewed and updated as appropriate:   She has a past medical history of No known health problems and Pneumonia ,  does not have any pertinent problems on file  ,   has a past surgical history that includes No past surgeries  ,  family history includes Allergies in her sister; Bipolar disorder in her maternal uncle; Breast cancer in her maternal aunt; Coronary artery disease in her paternal grandfather; Hyperthyroidism in her maternal grandmother and paternal grandmother; Other in her paternal grandmother  ,   reports that she has never smoked  She has never used smokeless tobacco  She reports that she drinks alcohol  She reports that she does not use drugs  ,  has No Known Allergies     Current Outpatient Medications   Medication Sig Dispense Refill    clindamycin (CLINDAGEL) 1 % gel Apply topically 2 (two) times a day 60 g 5    norgestimate-ethinyl estradiol (ORTHO TRI-CYCLEN LO) 0 18/0 215/0 25 MG-25 MCG per tablet take 1 tablet by mouth once daily 28 tablet 9    sertraline (ZOLOFT) 25 mg tablet Take 1 tablet (25 mg total) by mouth daily 30 tablet 5    sertraline (ZOLOFT) 50 mg tablet Take 1 tablet (50 mg total) by mouth every morning 30 tablet 5    traZODone (DESYREL) 50 mg tablet TAKE 1 TABLET BY MOUTH EVERYDAY AT BEDTIME 30 tablet 1     No current facility-administered medications for this visit  Review of Systems   Constitutional: Positive for fatigue  Negative for chills  HENT: Negative for congestion and trouble swallowing  Respiratory: Negative for shortness of breath  Cardiovascular: Negative for chest pain and leg swelling  Gastrointestinal: Negative for abdominal pain, anorexia and nausea  Genitourinary: Negative for difficulty urinating  Musculoskeletal: Positive for arthralgias and back pain  Negative for gait problem, joint swelling and myalgias  Skin: Negative for rash  Neurological: Negative for dizziness, tingling, weakness, light-headedness, numbness and headaches  Psychiatric/Behavioral: Positive for depression, dysphoric mood and sleep disturbance  Negative for self-injury and suicidal ideas  The patient is nervous/anxious  Pt says she has moments of dissociation  Like zoning out but worse  Objective:  Vitals:    02/14/20 1017   BP: 104/66   Pulse: 80   Temp: 98 3 °F (36 8 °C)   SpO2: 98%   Weight: 66 7 kg (147 lb)     Body mass index is 25 23 kg/m²  Physical Exam   Constitutional: She is oriented to person, place, and time  She appears well-developed and well-nourished  HENT:   Head: Normocephalic  Right Ear: External ear normal    Left Ear: External ear normal    Eyes: Conjunctivae are normal    Cardiovascular: Normal rate and regular rhythm  Pulmonary/Chest: Effort normal and breath sounds normal    Musculoskeletal: Normal range of motion  She exhibits tenderness  She exhibits no edema  Right knee: She exhibits normal range of motion, no swelling, no effusion, no deformity and normal alignment  Tenderness found  Medial joint line tenderness noted  Lumbar back: She exhibits tenderness  She exhibits normal range of motion  Back:         Legs:  Neurological: She is alert and oriented to person, place, and time  Skin: Skin is warm and dry  Psychiatric: She has a normal mood and affect   Her behavior is normal  Judgment and thought content normal    Nursing note and vitals reviewed

## 2020-02-24 ENCOUNTER — SOCIAL WORK (OUTPATIENT)
Dept: BEHAVIORAL/MENTAL HEALTH CLINIC | Facility: CLINIC | Age: 25
End: 2020-02-24
Payer: COMMERCIAL

## 2020-02-24 DIAGNOSIS — F32.1 CURRENT MODERATE EPISODE OF MAJOR DEPRESSIVE DISORDER WITHOUT PRIOR EPISODE (HCC): Primary | ICD-10-CM

## 2020-02-24 PROCEDURE — 90834 PSYTX W PT 45 MINUTES: CPT | Performed by: SOCIAL WORKER

## 2020-02-24 PROCEDURE — 1036F TOBACCO NON-USER: CPT | Performed by: SOCIAL WORKER

## 2020-02-24 NOTE — PSYCH
Start Time 9:45AM-10:15AM  Assessment/Plan:  Bettye Schneider has has [facility] and was referred by [facility] of which we are not paneled  There are no diagnoses linked to this encounter  Subjective:  Discussion with Bettye Schneider about the insurance and explained the state insurance to her  She has a unique family life  She recently graduated from college is back living at home  Mom and dad are not together, mom is homeless  Her 23year old sister is away at school  Her 16year old sister is in high school  She feels as though she is expected to  the slack for her mother within the family home  She is currently working 2 jobs and working on getting her drivers license  She feels as though her mom has unresolved mental health issues  She is unsure if her father works as they "do not discuss that "  She reports that sometimes she has fleeting SI, no plan no intent, she states some days she feels as though it would just be easier if she was not here  She is friendly and cooperative although somewhat frustrated with the referrals  She reports "zoning out" at times and she has a minor in psychology and referred to it as possibly disassociating  Work was provided on grounding techniques  She was encouraged to track her "zoning out to assist her is trying to find a pattern  At one point she was referred by Gelacio Garcia to [facility] but the hour ride is too much for her as evidenced by self report  Patient ID: Anastacia Hercules is a 22 y o  female  HPI    Review of Jesus Berrios has a PHQ-2 Score of 6 and PHQ-9 Score of a 20  Objective:  Referrals given for [address] in [facility]  [place] [phone] and [facility] in [facility]  [place] [phone] and both agencies were contacted and both accept new patients and [facility] was contacted for contractors in network although they were not helpful  Roz Walshor the number to call member services    Contact information provided for this writer in the event she cannot find a new provider this writer will offer further assistance  Physical Exam  Mental Health Fleeting passive SI at times, no HI or AH/VH  Mood: Anxious  Affect: Congruent:  Appearance: Presents as stated age and casual and well groomed  Thought Process: Logical  Speech: Rapid and coherent

## 2020-03-28 DIAGNOSIS — F32.4 MAJOR DEPRESSIVE DISORDER WITH SINGLE EPISODE, IN PARTIAL REMISSION (HCC): ICD-10-CM

## 2020-03-28 RX ORDER — TRAZODONE HYDROCHLORIDE 50 MG/1
TABLET ORAL
Qty: 30 TABLET | Refills: 1 | Status: SHIPPED | OUTPATIENT
Start: 2020-03-28 | End: 2020-05-18 | Stop reason: SDUPTHER

## 2020-04-08 DIAGNOSIS — L70.0 ACNE VULGARIS: ICD-10-CM

## 2020-04-08 RX ORDER — CLINDAMYCIN PHOSPHATE 10 MG/G
GEL TOPICAL 2 TIMES DAILY
Qty: 60 G | Refills: 5 | Status: SHIPPED | OUTPATIENT
Start: 2020-04-08 | End: 2021-07-26 | Stop reason: ALTCHOICE

## 2020-05-18 DIAGNOSIS — F32.4 MAJOR DEPRESSIVE DISORDER WITH SINGLE EPISODE, IN PARTIAL REMISSION (HCC): ICD-10-CM

## 2020-05-18 RX ORDER — TRAZODONE HYDROCHLORIDE 50 MG/1
50 TABLET ORAL
Qty: 30 TABLET | Refills: 1 | Status: SHIPPED | OUTPATIENT
Start: 2020-05-18 | End: 2020-07-13

## 2020-07-12 DIAGNOSIS — F32.4 MAJOR DEPRESSIVE DISORDER WITH SINGLE EPISODE, IN PARTIAL REMISSION (HCC): ICD-10-CM

## 2020-07-13 RX ORDER — TRAZODONE HYDROCHLORIDE 50 MG/1
TABLET ORAL
Qty: 30 TABLET | Refills: 1 | Status: SHIPPED | OUTPATIENT
Start: 2020-07-13 | End: 2020-09-04 | Stop reason: SDUPTHER

## 2020-08-06 ENCOUNTER — ANNUAL EXAM (OUTPATIENT)
Dept: OBGYN CLINIC | Age: 25
End: 2020-08-06
Payer: COMMERCIAL

## 2020-08-06 ENCOUNTER — LAB (OUTPATIENT)
Dept: LAB | Facility: CLINIC | Age: 25
End: 2020-08-06
Payer: COMMERCIAL

## 2020-08-06 VITALS
SYSTOLIC BLOOD PRESSURE: 102 MMHG | HEIGHT: 64 IN | TEMPERATURE: 97.5 F | WEIGHT: 141.25 LBS | DIASTOLIC BLOOD PRESSURE: 64 MMHG | BODY MASS INDEX: 24.11 KG/M2

## 2020-08-06 DIAGNOSIS — Z11.3 SCREEN FOR STD (SEXUALLY TRANSMITTED DISEASE): ICD-10-CM

## 2020-08-06 DIAGNOSIS — Z30.41 SURVEILLANCE OF CONTRACEPTIVE PILL: ICD-10-CM

## 2020-08-06 DIAGNOSIS — L70.0 ACNE VULGARIS: ICD-10-CM

## 2020-08-06 DIAGNOSIS — Z30.011 ENCOUNTER FOR INITIAL PRESCRIPTION OF CONTRACEPTIVE PILLS: ICD-10-CM

## 2020-08-06 DIAGNOSIS — Z01.419 ENCOUNTER FOR GYNECOLOGICAL EXAMINATION WITHOUT ABNORMAL FINDING: Primary | ICD-10-CM

## 2020-08-06 PROBLEM — Z00.00 ANNUAL PHYSICAL EXAM: Status: RESOLVED | Noted: 2019-07-09 | Resolved: 2020-08-06

## 2020-08-06 PROBLEM — Z23 NEED FOR HPV VACCINATION: Status: RESOLVED | Noted: 2019-07-09 | Resolved: 2020-08-06

## 2020-08-06 LAB — HCV AB SER QL: NORMAL

## 2020-08-06 PROCEDURE — 87491 CHLMYD TRACH DNA AMP PROBE: CPT | Performed by: NURSE PRACTITIONER

## 2020-08-06 PROCEDURE — 87591 N.GONORRHOEAE DNA AMP PROB: CPT | Performed by: NURSE PRACTITIONER

## 2020-08-06 PROCEDURE — 87340 HEPATITIS B SURFACE AG IA: CPT

## 2020-08-06 PROCEDURE — 99395 PREV VISIT EST AGE 18-39: CPT | Performed by: NURSE PRACTITIONER

## 2020-08-06 PROCEDURE — 86803 HEPATITIS C AB TEST: CPT

## 2020-08-06 PROCEDURE — 86592 SYPHILIS TEST NON-TREP QUAL: CPT

## 2020-08-06 PROCEDURE — 87389 HIV-1 AG W/HIV-1&-2 AB AG IA: CPT

## 2020-08-06 PROCEDURE — 1036F TOBACCO NON-USER: CPT | Performed by: NURSE PRACTITIONER

## 2020-08-06 PROCEDURE — 3008F BODY MASS INDEX DOCD: CPT | Performed by: NURSE PRACTITIONER

## 2020-08-06 PROCEDURE — 36415 COLL VENOUS BLD VENIPUNCTURE: CPT

## 2020-08-06 RX ORDER — NORGESTIMATE AND ETHINYL ESTRADIOL 7DAYSX3 LO
1 KIT ORAL DAILY
Qty: 28 TABLET | Refills: 12 | Status: SHIPPED | OUTPATIENT
Start: 2020-08-06 | End: 2021-09-07 | Stop reason: SDUPTHER

## 2020-08-06 NOTE — PROGRESS NOTES
Diagnoses and all orders for this visit:    Encounter for gynecological examination without abnormal finding    Surveillance of contraceptive pill    Acne vulgaris    Encounter for initial prescription of contraceptive pills  -     norgestimate-ethinyl estradiol (ORTHO TRI-CYCLEN LO) 0 18/0 215/0 25 MG-25 MCG per tablet; Take 1 tablet by mouth daily          Calcium/vit d inclusion in the diet discussed, call with any issues, SBE reinforced, all concerns addressed  Pleasant 22 y o  premenopausal female here for annual exam  She denies any issues with bleeding or her menses  Reports regular cycles on ocp  Denies history of abnormal pap smears  Last Pap 2019 nml, no pap today  Denies vaginal issues  Denies pelvic pain  Denies any issues with her BCM  Sexually active without any concerns  Requests full STD check  Completed her gardasil series this yr      Past Medical History:   Diagnosis Date    No known health problems     Pneumonia      Past Surgical History:   Procedure Laterality Date    NO PAST SURGERIES       Family History   Problem Relation Age of Onset    Breast cancer Maternal Aunt     Allergies Sister     Hyperthyroidism Maternal Grandmother     Hyperthyroidism Paternal Grandmother     Other Paternal Grandmother         CABG x4    Coronary artery disease Paternal Grandfather     Bipolar disorder Maternal Uncle     Colon cancer Neg Hx     Ovarian cancer Neg Hx     Uterine cancer Neg Hx     Cervical cancer Neg Hx      Social History     Tobacco Use    Smoking status: Never Smoker    Smokeless tobacco: Never Used   Substance Use Topics    Alcohol use: Yes     Frequency: Monthly or less     Comment: socially     Drug use: Never       Current Outpatient Medications:     clindamycin (CLINDAGEL) 1 % gel, Apply topically 2 (two) times a day, Disp: 60 g, Rfl: 5    norgestimate-ethinyl estradiol (ORTHO TRI-CYCLEN LO) 0 18/0 215/0 25 MG-25 MCG per tablet, Take 1 tablet by mouth daily, Disp: 28 tablet, Rfl: 12    sertraline (ZOLOFT) 25 mg tablet, Take 1 tablet (25 mg total) by mouth daily, Disp: 30 tablet, Rfl: 5    sertraline (ZOLOFT) 50 mg tablet, Take 1 tablet (50 mg total) by mouth every morning, Disp: 30 tablet, Rfl: 5    traZODone (DESYREL) 50 mg tablet, TAKE 1 TABLET BY MOUTH DAILY AT BEDTIME, Disp: 30 tablet, Rfl: 1  Patient Active Problem List    Diagnosis Date Noted    Acute pain of right knee 2020    Major depressive disorder with single episode, in partial remission ([facility] [address] ) 2019    Acne vulgaris 2019       No Known Allergies    OB History    Para Term  AB Living   0 0 0 0 0 0   SAB TAB Ectopic Multiple Live Births   0 0 0 0 0   Obstetric Comments   Menarche: 13y/o      Customer service/call  for Elena    Vitals:    20 1112   BP: 102/64   BP Location: Right arm   Patient Position: Sitting   Cuff Size: Standard   Temp: 97 5 °F (36 4 °C)   Weight: 64 1 kg (141 lb 4 oz)   Height: 5' 3 78" (1 62 m)     Body mass index is 24 41 kg/m²  Review of Systems   Constitutional: Negative for chills, fatigue, fever and unexpected weight change  Respiratory: Negative for shortness of breath  Gastrointestinal: Negative for anal bleeding, blood in stool, constipation and diarrhea  Genitourinary: Negative for difficulty urinating, dysuria and hematuria  Physical Exam   Constitutional: She appears well-developed and well-nourished  No distress  HENT:   Head: Normocephalic  Neck: Normal range of motion  Neck supple  Pulmonary: Effort normal   Breasts: bilateral without masses, skin changes or nipple discharge  Bilaterally soft and warm to touch  No areas of erythema or pain  Abdominal: Soft  Pelvic exam was performed with patient supine  No labial fusion  There is no rash, tenderness, lesion or injury on the right labia  There is no rash, tenderness, lesion or injury on the left labia   Urethral meatus does not show any tenderness, inflammation or discharge  Palpation of midline bladder without pain or discomfort  Uterus is not deviated, not enlarged, not fixed and not tender  Cervix exhibits no motion tenderness, no discharge and no friability  Right adnexum displays no mass, no tenderness and no fullness  Left adnexum displays no mass, no tenderness and no fullness  No erythema or tenderness in the vagina  No foreign body in the vagina  No signs of injury around the vagina  No vaginal discharge found  No signs of injury around the vagina or anus  Perineum without lesions, signs of injury, erythema or swelling  Lymphadenopathy:        Right: No inguinal adenopathy present  Left: No inguinal adenopathy present

## 2020-08-06 NOTE — PATIENT INSTRUCTIONS
Birth Control Pills   WHAT YOU NEED TO KNOW:   What are birth control pills? Birth control pills are also called oral contraceptives, or the pill  It is medicine that helps prevent pregnancy  Birth control pills work by preventing ovulation  Ovulation is when the ovaries make and release an egg cell each month  If this egg gets fertilized by sperm, pregnancy occurs  Birth control pills may also help to prevent pregnancy by keeping sperm from fertilizing an egg  What may be done before I can start taking birth control pills? You need to see your healthcare provider to get a prescription  Any of the following may be done before your healthcare provider gives you a prescription:  · Your healthcare provider will ask you about diseases and illnesses you have had in the past  He will check your risk for blood clots, heart conditions, or stroke  He will also check your blood pressure, and may do a breast and pelvic exam  A Pap smear may also be done during the pelvic exam  This is a test to make sure you do not have abnormal changes on your cervix  You may need other tests, such as a urine test, to make sure you are not pregnant  · Your healthcare provider will ask if you take any medicines and if you smoke  Smoking increases your risk for stroke, heart attack, or a blood clot in your lungs  If you smoke, you should not take certain kinds of birth control pills  What are the advantages of birth control pills? When birth control pills are used correctly, the chances of getting pregnant are very low  Birth control pills may help decrease bleeding and pain during your monthly period  They may also help prevent cancer of the uterus and ovaries  What are the disadvantages of birth control pills? You may have sudden changes in your mood or feelings while you take birth control pills  You may have nausea and decreased sex drive  You may have an increased appetite and rapid weight gain   You may also have bleeding in between periods, less frequent periods, vaginal dryness, and breast pain  Birth control pills will not protect you from sexually transmitted infections  Rarely, some birth control pills can increase your risk for a blood clot  This may become life-threatening  What should I do if I decide I want to get pregnant? If you are planning to have a baby, ask your healthcare provider when you may stop taking your birth control pills  It may take some time for you to start ovulating again  Ask your healthcare provider for more information about pregnancy after birth control pills  When should I start taking birth control pills after I have a baby? If you are not breastfeeding, you may start taking birth control pills 3 weeks after you give birth  You may be able to take certain types of birth control pills if you are breastfeeding  These pills can be started from 6 weeks to 6 months after you give birth  Ask your healthcare provider for more information about when to start taking birth control pills after you give birth  When should I contact my healthcare provider? · You have forgotten to take a birth control pill  · You have mood changes, such as depression, since starting birth control pills  · You have nausea or you are vomiting  · You have severe abdominal pain  · You missed a period and have questions or concerns about being pregnant  · You still have bleeding 4 months after taking birth control pills correctly  · You have questions or concerns about your condition or care  When should I seek immediate care or call 911? · Your arm or leg feels warm, tender, and painful  It may look swollen and red  · You feel lightheaded, short of breath, and have chest pain  · You cough up blood      · You have any of the following signs of a stroke:      ¨ Numbness or drooping on one side of your face     ¨ Weakness in an arm or leg    ¨ Confusion or difficulty speaking    ¨ Dizziness, a severe headache, or vision loss    · You have severe pain, numbness, or swelling in your arms or legs  CARE AGREEMENT:   You have the right to help plan your care  Learn about your health condition and how it may be treated  Discuss treatment options with your caregivers to decide what care you want to receive  You always have the right to refuse treatment  The above information is an  only  It is not intended as medical advice for individual conditions or treatments  Talk to your doctor, nurse or pharmacist before following any medical regimen to see if it is safe and effective for you  © 2017 [facility] Information is for End User's use only and may not be sold, redistributed or otherwise used for commercial purposes  All illustrations and images included in CareNotes® are the copyrighted property of A D A M [employer] Inc  or [employer]

## 2020-08-07 LAB
HBV SURFACE AG SER QL: NORMAL
HIV 1+2 AB+HIV1 P24 AG SERPL QL IA: NORMAL
RPR SER QL: NORMAL

## 2020-08-10 LAB
C TRACH DNA SPEC QL NAA+PROBE: NEGATIVE
N GONORRHOEA DNA SPEC QL NAA+PROBE: NEGATIVE

## 2020-08-20 DIAGNOSIS — F32.4 MAJOR DEPRESSIVE DISORDER WITH SINGLE EPISODE, IN PARTIAL REMISSION (HCC): ICD-10-CM

## 2020-08-20 RX ORDER — SERTRALINE HYDROCHLORIDE 25 MG/1
TABLET, FILM COATED ORAL
Qty: 30 TABLET | Refills: 0 | Status: SHIPPED | OUTPATIENT
Start: 2020-08-20 | End: 2020-09-04 | Stop reason: SDUPTHER

## 2020-08-26 DIAGNOSIS — F32.4 MAJOR DEPRESSIVE DISORDER WITH SINGLE EPISODE, IN PARTIAL REMISSION (HCC): ICD-10-CM

## 2020-08-26 NOTE — TELEPHONE ENCOUNTER
Requested medication(s) are due for refill today: Yes  Patient has already received a courtesy refill: No  Other reason request has been forwarded to provider: pt has upcomming appt with you

## 2020-09-04 ENCOUNTER — OFFICE VISIT (OUTPATIENT)
Dept: FAMILY MEDICINE CLINIC | Facility: CLINIC | Age: 25
End: 2020-09-04
Payer: COMMERCIAL

## 2020-09-04 VITALS
OXYGEN SATURATION: 98 % | WEIGHT: 140 LBS | DIASTOLIC BLOOD PRESSURE: 76 MMHG | BODY MASS INDEX: 23.9 KG/M2 | SYSTOLIC BLOOD PRESSURE: 112 MMHG | TEMPERATURE: 98.7 F | HEART RATE: 77 BPM | HEIGHT: 64 IN

## 2020-09-04 DIAGNOSIS — H91.93 DECREASED HEARING OF BOTH EARS: ICD-10-CM

## 2020-09-04 DIAGNOSIS — M54.50 CHRONIC RIGHT-SIDED LOW BACK PAIN WITHOUT SCIATICA: ICD-10-CM

## 2020-09-04 DIAGNOSIS — G89.29 CHRONIC RIGHT-SIDED LOW BACK PAIN WITHOUT SCIATICA: ICD-10-CM

## 2020-09-04 DIAGNOSIS — G89.29 CHRONIC PAIN OF RIGHT KNEE: Primary | ICD-10-CM

## 2020-09-04 DIAGNOSIS — M25.561 CHRONIC PAIN OF RIGHT KNEE: Primary | ICD-10-CM

## 2020-09-04 DIAGNOSIS — F32.4 MAJOR DEPRESSIVE DISORDER WITH SINGLE EPISODE, IN PARTIAL REMISSION (HCC): ICD-10-CM

## 2020-09-04 PROCEDURE — 99214 OFFICE O/P EST MOD 30 MIN: CPT | Performed by: PHYSICIAN ASSISTANT

## 2020-09-04 PROCEDURE — 1036F TOBACCO NON-USER: CPT | Performed by: PHYSICIAN ASSISTANT

## 2020-09-04 RX ORDER — SERTRALINE HYDROCHLORIDE 25 MG/1
25 TABLET, FILM COATED ORAL DAILY
Qty: 30 TABLET | Refills: 5 | Status: SHIPPED | OUTPATIENT
Start: 2020-09-04 | End: 2021-03-26 | Stop reason: SDUPTHER

## 2020-09-04 RX ORDER — TRAZODONE HYDROCHLORIDE 50 MG/1
50 TABLET ORAL
Qty: 30 TABLET | Refills: 5 | Status: SHIPPED | OUTPATIENT
Start: 2020-09-04 | End: 2021-03-26 | Stop reason: SDUPTHER

## 2020-09-04 NOTE — PROGRESS NOTES
Assessment/Plan:          Diagnoses and all orders for this visit:    Chronic pain of right knee    Major depressive disorder with single episode, in partial remission (HCC)  -     sertraline (ZOLOFT) 25 mg tablet; Take 1 tablet (25 mg total) by mouth daily In addition to 50 mg tablet  -     traZODone (DESYREL) 50 mg tablet; Take 1 tablet (50 mg total) by mouth daily at bedtime    Chronic right-sided low back pain without sciatica  -     XR spine lumbar minimum 4 views non injury; Future    Decreased hearing of both ears  -     Ambulatory Referral to Otolaryngology; Future            Subjective:      Patient ID: Meng Decker is a 22 y o  female  Patient is here for her 6 month follow-up  She needs renewal of her antidepressants  She has several complaints 1 of which is knee pain and low back pain that were present 6 months ago but she never did any x-rays or follow-up due to Calixt[place]  Patient is also complaining of a decrease in her hearing  She states her father and sister have noticed this also  The following portions of the patient's history were reviewed and updated as appropriate:   She has a past medical history of No known health problems and Pneumonia ,  does not have any pertinent problems on file  ,   has a past surgical history that includes No past surgeries  ,  family history includes Allergies in her sister; Bipolar disorder in her maternal uncle; Breast cancer in her maternal aunt; Coronary artery disease in her paternal grandfather; Hyperthyroidism in her maternal grandmother and paternal grandmother; Other in her paternal grandmother  ,   reports that she has never smoked  She has never used smokeless tobacco  She reports current alcohol use  She reports that she does not use drugs  ,  has No Known Allergies     Current Outpatient Medications   Medication Sig Dispense Refill    clindamycin (CLINDAGEL) 1 % gel Apply topically 2 (two) times a day 60 g 5    norgestimate-ethinyl estradiol (ORTHO TRI-CYCLEN LO) 0 18/0 215/0 25 MG-25 MCG per tablet Take 1 tablet by mouth daily 28 tablet 12    sertraline (ZOLOFT) 25 mg tablet Take 1 tablet (25 mg total) by mouth daily In addition to 50 mg tablet 30 tablet 5    sertraline (ZOLOFT) 50 mg tablet TAKE 1 TABLET BY MOUTH EVERY MORNING 30 tablet 5    traZODone (DESYREL) 50 mg tablet Take 1 tablet (50 mg total) by mouth daily at bedtime 30 tablet 5     No current facility-administered medications for this visit  Review of Systems   Constitutional: Negative for activity change, appetite change, fatigue and fever  HENT: Positive for hearing loss  Negative for congestion, ear pain, sore throat and trouble swallowing  Respiratory: Negative for cough, chest tightness and shortness of breath  Cardiovascular: Negative for chest pain, palpitations and leg swelling  Gastrointestinal: Negative for abdominal pain, constipation, diarrhea and nausea  Genitourinary: Negative for dysuria, flank pain and menstrual problem  Musculoskeletal: Positive for arthralgias and back pain  Negative for gait problem, joint swelling and myalgias  Neurological: Negative for dizziness, light-headedness and headaches  Psychiatric/Behavioral: Negative for decreased concentration, dysphoric mood, self-injury and sleep disturbance  The patient is not nervous/anxious  Objective:  Vitals:    09/04/20 1503   BP: 112/76   Pulse: 77   Temp: 98 7 °F (37 1 °C)   SpO2: 98%   Weight: 63 5 kg (140 lb)   Height: 5' 3 78" (1 62 m)     Body mass index is 24 2 kg/m²  Physical Exam  Vitals signs and nursing note reviewed  Constitutional:       Appearance: Normal appearance  She is normal weight  HENT:      Head: Normocephalic and atraumatic  Right Ear: External ear normal  There is impacted cerumen  Left Ear: External ear normal  There is impacted cerumen  Eyes:      Extraocular Movements: Extraocular movements intact        Conjunctiva/sclera: Conjunctivae normal    Neck:      Musculoskeletal: Normal range of motion  No neck rigidity or muscular tenderness  Cardiovascular:      Rate and Rhythm: Normal rate and regular rhythm  Pulses: Normal pulses  Heart sounds: No murmur  Pulmonary:      Effort: Pulmonary effort is normal       Breath sounds: Normal breath sounds  Abdominal:      General: Abdomen is flat  Bowel sounds are normal       Palpations: Abdomen is soft  There is no mass  Tenderness: There is no abdominal tenderness  There is no right CVA tenderness or left CVA tenderness  Musculoskeletal:         General: No swelling or tenderness  Right lower leg: No edema  Left lower leg: No edema  Lymphadenopathy:      Cervical: No cervical adenopathy  Skin:     General: Skin is warm and dry  Neurological:      General: No focal deficit present  Mental Status: She is alert and oriented to person, place, and time     Psychiatric:         Mood and Affect: Mood normal          Behavior: Behavior normal

## 2020-09-14 ENCOUNTER — OFFICE VISIT (OUTPATIENT)
Dept: URGENT CARE | Facility: MEDICAL CENTER | Age: 25
End: 2020-09-14
Payer: COMMERCIAL

## 2020-09-14 VITALS
RESPIRATION RATE: 18 BRPM | HEART RATE: 78 BPM | TEMPERATURE: 97.1 F | WEIGHT: 140 LBS | BODY MASS INDEX: 23.9 KG/M2 | DIASTOLIC BLOOD PRESSURE: 98 MMHG | HEIGHT: 64 IN | OXYGEN SATURATION: 100 % | SYSTOLIC BLOOD PRESSURE: 145 MMHG

## 2020-09-14 DIAGNOSIS — H61.22 IMPACTED CERUMEN OF LEFT EAR: Primary | ICD-10-CM

## 2020-09-14 PROCEDURE — 99283 EMERGENCY DEPT VISIT LOW MDM: CPT | Performed by: PHYSICIAN ASSISTANT

## 2020-09-14 PROCEDURE — G0382 LEV 3 HOSP TYPE B ED VISIT: HCPCS | Performed by: PHYSICIAN ASSISTANT

## 2020-09-14 PROCEDURE — 69209 REMOVE IMPACTED EAR WAX UNI: CPT | Performed by: PHYSICIAN ASSISTANT

## 2020-09-14 PROCEDURE — 99213 OFFICE O/P EST LOW 20 MIN: CPT | Performed by: PHYSICIAN ASSISTANT

## 2020-09-14 NOTE — PATIENT INSTRUCTIONS
Cerumen impaction  Follow up with PCP in 3-5 days  Proceed to  ER if symptoms worsen  Cerumen Impaction   WHAT YOU NEED TO KNOW:   Cerumen impaction is the blockage of the outer ear canal by tightly packed cerumen (earwax)  It is generally treated with procedures such as flushing or suctioning the ear canal or the use of instruments to remove the impaction  DISCHARGE INSTRUCTIONS:   Medicines:  · Ear drops: These are used to soften the wax in your ear  Wax softening ear drops may be bought without a prescription  Ask your healthcare provider how often you should use this medicine  Read the instructions carefully before you use the ear drops  Do the following when you put in ear drops:     ¨ Warm the drops by holding the bottle in your hands for a few minutes  Cold ear drops may make you dizzy  ¨ Lie down with the affected ear toward the ceiling  You may also stand with your head tilted to one side  ¨ Pull your ear lobe up and back, and place the correct number of drops into the ear  ¨ Keep your ear facing up for 5 to 10 minutes so the drops coat the outer ear canal      ¨ Gently clean the outer part of the ear with a cotton swab  Do not  place the cotton swab or anything inside your ear canal  This increases the risk of damaging your eardrum  · Take your medicine as directed  Contact your healthcare provider if you think your medicine is not helping or if you have side effects  Tell him of her if you are allergic to any medicine  Keep a list of the medicines, vitamins, and herbs you take  Include the amounts, and when and why you take them  Bring the list or the pill bottles to follow-up visits  Carry your medicine list with you in case of an emergency  Follow up with your healthcare provider as directed:  Write down your questions so you remember to ask them during your visits  Contact your healthcare provider if:   · You have a fever       · You have trouble hearing or ringing in your ear     · You have questions about your condition or care  Return to the emergency department if:   · You feel dizzy  · You have discharge or blood coming out of your ear  · Your ear pain does not go away or gets worse  © 2017 [address] [employer] Information is for End User's use only and may not be sold, redistributed or otherwise used for commercial purposes  All illustrations and images included in CareNotes® are the copyrighted property of [employer] M , Inc  or [employer]  The above information is an  only  It is not intended as medical advice for individual conditions or treatments  Talk to your doctor, nurse or pharmacist before following any medical regimen to see if it is safe and effective for you

## 2020-09-14 NOTE — PROGRESS NOTES
[facility]        NAME: Mindy Esquivel is a 22 y o  female  : 1995    MRN: [id number]  DATE: 2020  TIME: 7:09 PM    Assessment and Plan   Impacted cerumen of left ear [H61 22]  1  Impacted cerumen of left ear           Patient Instructions     Cerumen impaction  Follow up with PCP in 3-5 days  Proceed to  ER if symptoms worsen  Chief Complaint     Chief Complaint   Patient presents with    Cerumen Impaction     Wax in right ear  History of Present Illness       23 y/o female presents c/o impacted cerumen to left ear  Denies trauma, fever, chills      Review of Systems   Review of Systems   Constitutional: Negative  HENT: Negative  Eyes: Negative  Respiratory: Negative  Negative for cough, chest tightness, shortness of breath, wheezing and stridor  Cardiovascular: Negative  Negative for chest pain, palpitations and leg swelling           Current Medications       Current Outpatient Medications:     clindamycin (CLINDAGEL) 1 % gel, Apply topically 2 (two) times a day, Disp: 60 g, Rfl: 5    norgestimate-ethinyl estradiol (ORTHO TRI-CYCLEN LO) 0 18/0 215/0 25 MG-25 MCG per tablet, Take 1 tablet by mouth daily, Disp: 28 tablet, Rfl: 12    sertraline (ZOLOFT) 25 mg tablet, Take 1 tablet (25 mg total) by mouth daily In addition to 50 mg tablet, Disp: 30 tablet, Rfl: 5    sertraline (ZOLOFT) 50 mg tablet, TAKE 1 TABLET BY MOUTH EVERY MORNING, Disp: 30 tablet, Rfl: 5    traZODone (DESYREL) 50 mg tablet, Take 1 tablet (50 mg total) by mouth daily at bedtime, Disp: 30 tablet, Rfl: 5    Current Allergies     Allergies as of 2020    (No Known Allergies)            The following portions of the patient's history were reviewed and updated as appropriate: allergies, current medications, past family history, past medical history, past social history, past surgical history and problem list      Past Medical History:   Diagnosis Date    Anxiety     Depression     No known health problems     Pneumonia        Past Surgical History:   Procedure Laterality Date    NO PAST SURGERIES         Family History   Problem Relation Age of Onset    Breast cancer Maternal Aunt     Allergies Sister     Hyperthyroidism Maternal Grandmother     Hyperthyroidism Paternal Grandmother     Other Paternal Grandmother         CABG x4    Coronary artery disease Paternal Grandfather     Bipolar disorder Maternal Uncle     No Known Problems Mother     No Known Problems Father     Colon cancer Neg Hx     Ovarian cancer Neg Hx     Uterine cancer Neg Hx     Cervical cancer Neg Hx          Medications have been verified  Objective   /98   Pulse 78   Temp (!) 97 1 °F (36 2 °C) (Temporal)   Resp 18   Ht 5' 3 78" (1 62 m)   Wt 63 5 kg (140 lb)   LMP 09/04/2020 (Exact Date)   SpO2 100%   BMI 24 20 kg/m²          Physical Exam     Physical Exam  Constitutional:       General: She is not in acute distress  Appearance: She is well-developed  She is not diaphoretic  HENT:      Head: Normocephalic and atraumatic  Right Ear: Hearing, tympanic membrane, ear canal and external ear normal       Left Ear: Hearing, ear canal and external ear normal  There is impacted cerumen  Mouth/Throat:      Pharynx: Uvula midline  Eyes:      Conjunctiva/sclera: Conjunctivae normal       Pupils: Pupils are equal, round, and reactive to light  Neck:      Musculoskeletal: Normal range of motion and neck supple  Cardiovascular:      Rate and Rhythm: Normal rate and regular rhythm  Heart sounds: Normal heart sounds  Pulmonary:      Effort: Pulmonary effort is normal       Breath sounds: Normal breath sounds  Lymphadenopathy:      Cervical: Cervical adenopathy present         Ear cerumen removal    Date/Time: 9/14/2020 7:20 PM  Performed by: Lexa Camara PA-C  Authorized by: Lexa Camara PA-C     Patient location:  Clinic  Consent:     Consent obtained:  Verbal Consent given by:  Patient    Risks discussed:  Bleeding, infection, pain, TM perforation, incomplete removal and dizziness    Alternatives discussed:  No treatment, delayed treatment, alternative treatment, observation and referral  Universal protocol:     Procedure explained and questions answered to patient or proxy's satisfaction: yes      Patient identity confirmed:  Verbally with patient  Procedure details:     Local anesthetic:  None    Location:  L ear    Procedure type: irrigation only    Post-procedure details:     Complication:  None    Hearing quality:  Normal    Patient tolerance of procedure:   Tolerated well, no immediate complications

## 2020-11-14 ENCOUNTER — OFFICE VISIT (OUTPATIENT)
Dept: URGENT CARE | Facility: CLINIC | Age: 25
End: 2020-11-14
Payer: COMMERCIAL

## 2020-11-14 VITALS
OXYGEN SATURATION: 100 % | HEART RATE: 81 BPM | WEIGHT: 140 LBS | HEIGHT: 63 IN | TEMPERATURE: 98.3 F | RESPIRATION RATE: 20 BRPM | BODY MASS INDEX: 24.8 KG/M2

## 2020-11-14 DIAGNOSIS — Z11.59 SPECIAL SCREENING EXAMINATION FOR VIRAL DISEASE: Primary | ICD-10-CM

## 2020-11-14 DIAGNOSIS — R09.81 SINUS CONGESTION: ICD-10-CM

## 2020-11-14 PROCEDURE — 99283 EMERGENCY DEPT VISIT LOW MDM: CPT | Performed by: NURSE PRACTITIONER

## 2020-11-14 PROCEDURE — 99203 OFFICE O/P NEW LOW 30 MIN: CPT | Performed by: NURSE PRACTITIONER

## 2020-11-14 PROCEDURE — U0003 INFECTIOUS AGENT DETECTION BY NUCLEIC ACID (DNA OR RNA); SEVERE ACUTE RESPIRATORY SYNDROME CORONAVIRUS 2 (SARS-COV-2) (CORONAVIRUS DISEASE [COVID-19]), AMPLIFIED PROBE TECHNIQUE, MAKING USE OF HIGH THROUGHPUT TECHNOLOGIES AS DESCRIBED BY CMS-2020-01-R: HCPCS | Performed by: NURSE PRACTITIONER

## 2020-11-14 PROCEDURE — G0382 LEV 3 HOSP TYPE B ED VISIT: HCPCS | Performed by: NURSE PRACTITIONER

## 2020-11-17 LAB — SARS-COV-2 RNA SPEC QL NAA+PROBE: NOT DETECTED

## 2020-11-20 ENCOUNTER — OFFICE VISIT (OUTPATIENT)
Dept: URGENT CARE | Facility: CLINIC | Age: 25
End: 2020-11-20
Payer: COMMERCIAL

## 2020-11-20 VITALS
HEART RATE: 88 BPM | HEIGHT: 63 IN | WEIGHT: 140 LBS | TEMPERATURE: 98.8 F | RESPIRATION RATE: 18 BRPM | BODY MASS INDEX: 24.8 KG/M2 | OXYGEN SATURATION: 98 %

## 2020-11-20 DIAGNOSIS — Z11.59 SPECIAL SCREENING EXAMINATION FOR UNSPECIFIED VIRAL DISEASE: Primary | ICD-10-CM

## 2020-11-20 PROCEDURE — 99283 EMERGENCY DEPT VISIT LOW MDM: CPT | Performed by: NURSE PRACTITIONER

## 2020-11-20 PROCEDURE — 99213 OFFICE O/P EST LOW 20 MIN: CPT | Performed by: NURSE PRACTITIONER

## 2020-11-20 PROCEDURE — G0382 LEV 3 HOSP TYPE B ED VISIT: HCPCS | Performed by: NURSE PRACTITIONER

## 2020-11-20 PROCEDURE — U0003 INFECTIOUS AGENT DETECTION BY NUCLEIC ACID (DNA OR RNA); SEVERE ACUTE RESPIRATORY SYNDROME CORONAVIRUS 2 (SARS-COV-2) (CORONAVIRUS DISEASE [COVID-19]), AMPLIFIED PROBE TECHNIQUE, MAKING USE OF HIGH THROUGHPUT TECHNOLOGIES AS DESCRIBED BY CMS-2020-01-R: HCPCS | Performed by: NURSE PRACTITIONER

## 2020-11-22 LAB — SARS-COV-2 RNA SPEC QL NAA+PROBE: NOT DETECTED

## 2020-12-22 ENCOUNTER — OFFICE VISIT (OUTPATIENT)
Dept: URGENT CARE | Facility: CLINIC | Age: 25
End: 2020-12-22
Payer: COMMERCIAL

## 2020-12-22 VITALS
BODY MASS INDEX: 24.8 KG/M2 | HEIGHT: 63 IN | TEMPERATURE: 99 F | OXYGEN SATURATION: 99 % | HEART RATE: 71 BPM | WEIGHT: 140 LBS | RESPIRATION RATE: 20 BRPM

## 2020-12-22 DIAGNOSIS — Z11.59 SPECIAL SCREENING EXAMINATION FOR UNSPECIFIED VIRAL DISEASE: Primary | ICD-10-CM

## 2020-12-22 PROCEDURE — U0003 INFECTIOUS AGENT DETECTION BY NUCLEIC ACID (DNA OR RNA); SEVERE ACUTE RESPIRATORY SYNDROME CORONAVIRUS 2 (SARS-COV-2) (CORONAVIRUS DISEASE [COVID-19]), AMPLIFIED PROBE TECHNIQUE, MAKING USE OF HIGH THROUGHPUT TECHNOLOGIES AS DESCRIBED BY CMS-2020-01-R: HCPCS | Performed by: PHYSICIAN ASSISTANT

## 2020-12-22 PROCEDURE — G0382 LEV 3 HOSP TYPE B ED VISIT: HCPCS | Performed by: PHYSICIAN ASSISTANT

## 2020-12-22 PROCEDURE — 99213 OFFICE O/P EST LOW 20 MIN: CPT | Performed by: PHYSICIAN ASSISTANT

## 2020-12-22 PROCEDURE — 99283 EMERGENCY DEPT VISIT LOW MDM: CPT | Performed by: PHYSICIAN ASSISTANT

## 2020-12-23 LAB — SARS-COV-2 RNA SPEC QL NAA+PROBE: NOT DETECTED

## 2021-02-15 ENCOUNTER — TELEPHONE (OUTPATIENT)
Dept: FAMILY MEDICINE CLINIC | Facility: CLINIC | Age: 26
End: 2021-02-15

## 2021-02-15 NOTE — TELEPHONE ENCOUNTER
Pt states she would like to know what the process is for getting a emotional support animal certification for her cat  Pt states that she spoke to hr therapist and the therapist told her she needs to speak with her primary  Please advise

## 2021-02-17 DIAGNOSIS — F32.4 MAJOR DEPRESSIVE DISORDER WITH SINGLE EPISODE, IN PARTIAL REMISSION (HCC): ICD-10-CM

## 2021-03-21 DIAGNOSIS — F32.4 MAJOR DEPRESSIVE DISORDER WITH SINGLE EPISODE, IN PARTIAL REMISSION (HCC): ICD-10-CM

## 2021-03-24 RX ORDER — TRAZODONE HYDROCHLORIDE 50 MG/1
TABLET ORAL
Qty: 30 TABLET | Refills: 5 | OUTPATIENT
Start: 2021-03-24

## 2021-03-24 RX ORDER — SERTRALINE HYDROCHLORIDE 25 MG/1
TABLET, FILM COATED ORAL
Qty: 30 TABLET | Refills: 1 | OUTPATIENT
Start: 2021-03-24

## 2021-03-25 DIAGNOSIS — F32.4 MAJOR DEPRESSIVE DISORDER WITH SINGLE EPISODE, IN PARTIAL REMISSION (HCC): ICD-10-CM

## 2021-03-26 DIAGNOSIS — F32.4 MAJOR DEPRESSIVE DISORDER WITH SINGLE EPISODE, IN PARTIAL REMISSION (HCC): ICD-10-CM

## 2021-03-26 RX ORDER — SERTRALINE HYDROCHLORIDE 25 MG/1
25 TABLET, FILM COATED ORAL DAILY
Qty: 30 TABLET | Refills: 0 | OUTPATIENT
Start: 2021-03-26

## 2021-03-26 RX ORDER — TRAZODONE HYDROCHLORIDE 50 MG/1
50 TABLET ORAL
Qty: 30 TABLET | Refills: 0 | OUTPATIENT
Start: 2021-03-26

## 2021-03-29 ENCOUNTER — TELEPHONE (OUTPATIENT)
Dept: FAMILY MEDICINE CLINIC | Facility: CLINIC | Age: 26
End: 2021-03-29

## 2021-03-29 RX ORDER — SERTRALINE HYDROCHLORIDE 25 MG/1
25 TABLET, FILM COATED ORAL DAILY
Qty: 30 TABLET | Refills: 0 | Status: SHIPPED | OUTPATIENT
Start: 2021-03-29 | End: 2021-04-19

## 2021-03-29 RX ORDER — TRAZODONE HYDROCHLORIDE 50 MG/1
50 TABLET ORAL
Qty: 30 TABLET | Refills: 0 | Status: SHIPPED | OUTPATIENT
Start: 2021-03-29 | End: 2021-04-19

## 2021-03-29 NOTE — TELEPHONE ENCOUNTER
Courtesy refill   Pt has moved to [facility] and needs a courtesy refill until she can establish care with another

## 2021-03-29 NOTE — TELEPHONE ENCOUNTER
T/c from patient  She has moved  She needs another refill of medications until she can establish with new Dr  ?  [facility] [address] [facility]  Sertraline 25  Trazadone 50       Unable to pin meds to this message - states they are pended

## 2021-04-01 DIAGNOSIS — Z23 ENCOUNTER FOR IMMUNIZATION: ICD-10-CM

## 2021-04-18 DIAGNOSIS — F32.4 MAJOR DEPRESSIVE DISORDER WITH SINGLE EPISODE, IN PARTIAL REMISSION (HCC): ICD-10-CM

## 2021-04-19 RX ORDER — TRAZODONE HYDROCHLORIDE 50 MG/1
TABLET ORAL
Qty: 30 TABLET | Refills: 0 | Status: SHIPPED | OUTPATIENT
Start: 2021-04-19 | End: 2021-05-19

## 2021-04-19 RX ORDER — SERTRALINE HYDROCHLORIDE 25 MG/1
25 TABLET, FILM COATED ORAL DAILY
Qty: 30 TABLET | Refills: 0 | Status: SHIPPED | OUTPATIENT
Start: 2021-04-19 | End: 2021-05-19

## 2021-04-26 ENCOUNTER — IMMUNIZATIONS (OUTPATIENT)
Dept: FAMILY MEDICINE CLINIC | Facility: HOSPITAL | Age: 26
End: 2021-04-26

## 2021-04-26 DIAGNOSIS — Z23 ENCOUNTER FOR IMMUNIZATION: Primary | ICD-10-CM

## 2021-04-26 PROCEDURE — 0011A SARS-COV-2 / COVID-19 MRNA VACCINE (MODERNA) 100 MCG: CPT

## 2021-04-26 PROCEDURE — 91301 SARS-COV-2 / COVID-19 MRNA VACCINE (MODERNA) 100 MCG: CPT

## 2021-05-12 DIAGNOSIS — F32.4 MAJOR DEPRESSIVE DISORDER WITH SINGLE EPISODE, IN PARTIAL REMISSION (HCC): ICD-10-CM

## 2021-05-19 DIAGNOSIS — F32.4 MAJOR DEPRESSIVE DISORDER WITH SINGLE EPISODE, IN PARTIAL REMISSION (HCC): ICD-10-CM

## 2021-05-19 RX ORDER — TRAZODONE HYDROCHLORIDE 50 MG/1
TABLET ORAL
Qty: 30 TABLET | Refills: 0 | Status: SHIPPED | OUTPATIENT
Start: 2021-05-19 | End: 2021-06-18

## 2021-05-19 RX ORDER — SERTRALINE HYDROCHLORIDE 25 MG/1
25 TABLET, FILM COATED ORAL DAILY
Qty: 30 TABLET | Refills: 0 | Status: SHIPPED | OUTPATIENT
Start: 2021-05-19 | End: 2021-05-28 | Stop reason: SDUPTHER

## 2021-05-24 ENCOUNTER — IMMUNIZATIONS (OUTPATIENT)
Dept: FAMILY MEDICINE CLINIC | Facility: HOSPITAL | Age: 26
End: 2021-05-24

## 2021-05-24 DIAGNOSIS — Z23 ENCOUNTER FOR IMMUNIZATION: Primary | ICD-10-CM

## 2021-05-24 PROCEDURE — 91301 SARS-COV-2 / COVID-19 MRNA VACCINE (MODERNA) 100 MCG: CPT

## 2021-05-24 PROCEDURE — 0012A SARS-COV-2 / COVID-19 MRNA VACCINE (MODERNA) 100 MCG: CPT

## 2021-05-27 DIAGNOSIS — F32.4 MAJOR DEPRESSIVE DISORDER WITH SINGLE EPISODE, IN PARTIAL REMISSION (HCC): ICD-10-CM

## 2021-05-28 DIAGNOSIS — F32.4 MAJOR DEPRESSIVE DISORDER WITH SINGLE EPISODE, IN PARTIAL REMISSION (HCC): ICD-10-CM

## 2021-05-28 NOTE — TELEPHONE ENCOUNTER
She needs to find provider in her area then  Mely Mondragon has offices there   I will refill until pt gets appointment with new provider but not more than 3 months

## 2021-05-28 NOTE — TELEPHONE ENCOUNTER
LM for pt RCB, she is due for an appt  At her last OV on 9/2020 pt was advised to return in March for a physical exam  Pt did not return and no appt has been scheduled

## 2021-06-18 DIAGNOSIS — F32.4 MAJOR DEPRESSIVE DISORDER WITH SINGLE EPISODE, IN PARTIAL REMISSION (HCC): ICD-10-CM

## 2021-06-18 RX ORDER — TRAZODONE HYDROCHLORIDE 50 MG/1
TABLET ORAL
Qty: 30 TABLET | Refills: 0 | Status: SHIPPED | OUTPATIENT
Start: 2021-06-18 | End: 2021-07-26 | Stop reason: SDUPTHER

## 2021-06-19 DIAGNOSIS — F32.4 MAJOR DEPRESSIVE DISORDER WITH SINGLE EPISODE, IN PARTIAL REMISSION (HCC): ICD-10-CM

## 2021-07-01 DIAGNOSIS — F32.4 MAJOR DEPRESSIVE DISORDER WITH SINGLE EPISODE, IN PARTIAL REMISSION (HCC): ICD-10-CM

## 2021-07-02 ENCOUNTER — TELEPHONE (OUTPATIENT)
Dept: FAMILY MEDICINE CLINIC | Facility: CLINIC | Age: 26
End: 2021-07-02

## 2021-07-02 NOTE — TELEPHONE ENCOUNTER
Patient called asking for a refill for Zoloft - I advised her it went sent to the pharmacy today  Patient states they did not have it       Called pharmacy they state she picked up Zoloft on 6/24 with trazodone     Please advise

## 2021-07-06 NOTE — TELEPHONE ENCOUNTER
Patient called this morning stating thhe 50mg was sent in   She states she needs the 25mg    Please advise

## 2021-07-26 ENCOUNTER — OFFICE VISIT (OUTPATIENT)
Dept: FAMILY MEDICINE CLINIC | Facility: CLINIC | Age: 26
End: 2021-07-26
Payer: COMMERCIAL

## 2021-07-26 VITALS
RESPIRATION RATE: 18 BRPM | HEART RATE: 102 BPM | BODY MASS INDEX: 23.22 KG/M2 | TEMPERATURE: 98 F | OXYGEN SATURATION: 98 % | WEIGHT: 136 LBS | HEIGHT: 64 IN

## 2021-07-26 DIAGNOSIS — F33.41 RECURRENT MAJOR DEPRESSIVE DISORDER, IN PARTIAL REMISSION (HCC): ICD-10-CM

## 2021-07-26 DIAGNOSIS — M25.531 PAIN IN BOTH WRISTS: ICD-10-CM

## 2021-07-26 DIAGNOSIS — M25.532 PAIN IN BOTH WRISTS: ICD-10-CM

## 2021-07-26 DIAGNOSIS — Z30.41 USES ORAL CONTRACEPTION: ICD-10-CM

## 2021-07-26 DIAGNOSIS — Z12.4 CERVICAL CANCER SCREENING: ICD-10-CM

## 2021-07-26 DIAGNOSIS — F41.9 ANXIETY: ICD-10-CM

## 2021-07-26 DIAGNOSIS — L70.0 ACNE VULGARIS: ICD-10-CM

## 2021-07-26 DIAGNOSIS — Z00.00 PHYSICAL EXAM: Primary | ICD-10-CM

## 2021-07-26 PROCEDURE — 3725F SCREEN DEPRESSION PERFORMED: CPT | Performed by: NURSE PRACTITIONER

## 2021-07-26 PROCEDURE — 1036F TOBACCO NON-USER: CPT | Performed by: NURSE PRACTITIONER

## 2021-07-26 PROCEDURE — 3008F BODY MASS INDEX DOCD: CPT | Performed by: NURSE PRACTITIONER

## 2021-07-26 PROCEDURE — 99395 PREV VISIT EST AGE 18-39: CPT | Performed by: NURSE PRACTITIONER

## 2021-07-26 RX ORDER — CLINDAMYCIN AND BENZOYL PEROXIDE 10; 50 MG/G; MG/G
GEL TOPICAL 2 TIMES DAILY
Qty: 25 G | Refills: 1 | Status: SHIPPED | OUTPATIENT
Start: 2021-07-26 | End: 2021-12-14

## 2021-07-26 RX ORDER — TRAZODONE HYDROCHLORIDE 50 MG/1
50 TABLET ORAL
Qty: 90 TABLET | Refills: 1 | Status: SHIPPED | OUTPATIENT
Start: 2021-07-26 | End: 2022-01-25

## 2021-07-26 RX ORDER — SERTRALINE HYDROCHLORIDE 25 MG/1
25 TABLET, FILM COATED ORAL DAILY
Qty: 90 TABLET | Refills: 1 | Status: SHIPPED | OUTPATIENT
Start: 2021-07-26 | End: 2022-01-25

## 2021-07-26 NOTE — PROGRESS NOTES
FAMILY PRACTICE OFFICE VISIT       NAME: Lindsay Holman  AGE: 26 y o  SEX: female       : 1995        MRN: [id number]    Assessment and Plan     Problem List Items Addressed This Visit        Musculoskeletal and Integument    Acne vulgaris    Relevant Medications    clindamycin-benzoyl peroxide (BENZACLIN) gel       Other    Recurrent major depressive disorder, in partial remission ([facility])    Relevant Medications    sertraline (ZOLOFT) 25 mg tablet    sertraline (ZOLOFT) 50 mg tablet    traZODone (DESYREL) 50 mg tablet    Other Relevant Orders    Ambulatory referral to Psychiatry    Anxiety    Relevant Orders    Ambulatory referral to Psychiatry      Other Visit Diagnoses     Physical exam    -  Primary    Cervical cancer screening        Relevant Orders    Ambulatory referral to Obstetrics / Gynecology    Uses oral contraception        Relevant Orders    Ambulatory referral to Obstetrics / Gynecology    Pain in both wrists              1  Physical exam     2  Recurrent major depressive disorder, in partial remission ([facility] [address] )  Ambulatory referral to Psychiatry    sertraline (ZOLOFT) 25 mg tablet    sertraline (ZOLOFT) 50 mg tablet    traZODone (DESYREL) 50 mg tablet   3  Anxiety  Ambulatory referral to Psychiatry   4  Acne vulgaris  clindamycin-benzoyl peroxide (BENZACLIN) gel   5  Cervical cancer screening  Ambulatory referral to Obstetrics / Gynecology   6  Uses oral contraception  Ambulatory referral to Obstetrics / Gynecology   7  Pain in both wrists       Healthy-appearing 45-year-old female  Transitioning care from [facility] to our office  Currently being treated for anxiety and depression with sertraline 75 mg daily  This does make anxiety and depression more manageable  She is attending counseling  She is concerned she additionally has ADHD  Recommend follow-up with Psychiatry for further evaluation  She is agreeable    Contact information provided today     Trazodone is effective for insomnia  Acne vulgaris:  Has use clindamycin gel as a single agent, has also tried benzoyl peroxide is a single agent  Recommend trial of BenzaClin  Call if not effective  Pap smear is up-to-date  She was seeing Gynecology in [facility], needs to transfer care locally  Contact information provided today for local gynecology, Dr Poly Mccallum  Up-to-date on vaccinations  If our office is prescribing sertraline and trazodone for her, recommend follow-up every 6 months  If she establishes with Psychiatry and Psychiatry is managing her medications, may follow-up annually for physical exam   She verbalizes understanding  Bilateral wrist pain:  May be starting to develop carpal tunnel syndrome  Recommend trial of wrist splints  If symptoms should worsen or not improving, advised to call  Advised to schedule dental exam       Chief Complaint     Chief Complaint   Patient presents with    Establish Care    Depression/Anxiety     Since 13-13, actually diagnosed at 23yrs of age  History of Present Illness     [place] a 18-year-old female presenting today for annual physical exam and to establish care at our office  Prior PCP [facility]  Recently moved  Gynecology in [facility] area, needs to transfer care locally  Sertraline 75 mg daily, doing okay on this  Always tired  Less depression than in the past  Attends counseling  Mood is manageable  No suicidal ideations  Anxiety also, more manageable with Sertraline  Able to go about usual activities without difficulty  Trazodone at bedtime for insomnia, is effective  Vitamin d supplement uses time to time, but not regularly  Does not drive, learning to drive  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  Up to date on pap and gyn exam    Vaccines up to date  Had COVID-19 vaccines  Exercise: trying to exercise, could do more  Eating healthy, trying to eat more veggies  Worries about ADHD  Hard to focus  Easily distracted  When does get focused, only pays attention to that one thing  Forgets everything else  Acne long term  Clindamycin helped some, benzoyl peroxide seems to help  Periods good on OCP  Vision good with glasses, eye exam one year ago  Due for dental exam     Bilateral wrist pain;  Use hands a lot at work, no numbness or tingling  Wrist pain is a little better in current job  Prior job was working in [employer] 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  Review of Systems   Review of Systems   Constitutional: Positive for fatigue  Negative for activity change, appetite change, chills, diaphoresis, fever and unexpected weight change  HENT: Negative  Eyes: Positive for visual disturbance (vision good with glasses)  Respiratory: Negative  Cardiovascular: Negative  Gastrointestinal: Negative  Genitourinary: Negative  Musculoskeletal: Positive for arthralgias (bilateral volar wrist pain)  Skin: Negative  Neurological: Negative  Hematological: Negative  Psychiatric/Behavioral: Positive for dysphoric mood and sleep disturbance  Negative for self-injury and suicidal ideas  The patient is nervous/anxious          Active Problem List     Patient Active Problem List   Diagnosis    Acne vulgaris    Recurrent major depressive disorder, in partial remission ([facility] )    Anxiety       Past Medical History:  Past Medical History:   Diagnosis Date    Anxiety     Pneumonia        Past Surgical History:  Past Surgical History:   Procedure Laterality Date    NO PAST SURGERIES         Family History:  Family History   Problem Relation Age of Onset    Breast cancer Maternal Aunt     Allergies Sister     Hyperthyroidism Maternal Grandmother     Hyperthyroidism Paternal [de-identified] Other Paternal Grandmother         CABG x4    Coronary artery disease Paternal Grandfather     Bipolar disorder Maternal Uncle  No Known Problems Mother     No Known Problems Father     Colon cancer Neg Hx     Ovarian cancer Neg Hx     Uterine cancer Neg Hx     Cervical cancer Neg Hx        Social History:  Social History     Socioeconomic History    Marital status: Single     Spouse name: Not on file    Number of children: Not on file    Years of education: Not on file    Highest education level: Not on file   Occupational History    Not on file   Tobacco Use    Smoking status: Never Smoker    Smokeless tobacco: Never Used   Vaping Use    Vaping Use: Never used   Substance and Sexual Activity    Alcohol use: Yes     Comment: socially     Drug use: Never    Sexual activity: Yes     Birth control/protection: OCP   Other Topics Concern    Not on file   Social History Narrative    Not on file     Social Determinants of Health     Financial Resource Strain:     Difficulty of Paying Living Expenses:    Food Insecurity:     Worried About Running Out of Food in the Last Year:     Ran Out of Food in the Last Year:    Transportation Needs:     Lack of Transportation (Medical):  Lack of Transportation (Non-Medical):    Physical Activity:     Days of Exercise per Week:     Minutes of Exercise per Session:    Stress:     Feeling of Stress :    Social Connections:     Frequency of Communication with Friends and Family:     Frequency of Social Gatherings with Friends and Family:     Attends Hinduism Services:     Active Member of Clubs or Organizations:     Attends Club or Organization Meetings:     Marital Status:    Intimate Partner Violence:     Fear of Current or Ex-Partner:     Emotionally Abused:     Physically Abused:     Sexually Abused:        I have reviewed the patient's medical history in detail; there are no changes to the history as noted in the electronic medical record      Objective     Vitals:    07/26/21 0830   Pulse: 102   Resp: 18   Temp: 98 °F (36 7 °C)   TempSrc: Temporal   SpO2: 98% Weight: 61 7 kg (136 lb)   Height: 5' 3 5" (1 613 m)     Wt Readings from Last 3 Encounters:   07/26/21 61 7 kg (136 lb)   12/22/20 63 5 kg (140 lb)   11/20/20 63 5 kg (140 lb)     Physical Exam  Vitals and nursing note reviewed  Constitutional:       General: She is not in acute distress  Appearance: Normal appearance  She is well-developed  She is not ill-appearing  HENT:      Head: Normocephalic and atraumatic  Right Ear: Tympanic membrane and ear canal normal       Left Ear: Tympanic membrane and ear canal normal       Mouth/Throat:      Mouth: Mucous membranes are moist       Pharynx: Oropharynx is clear  Eyes:      Conjunctiva/sclera: Conjunctivae normal       Pupils: Pupils are equal, round, and reactive to light  Neck:      Thyroid: No thyromegaly  Cardiovascular:      Rate and Rhythm: Normal rate and regular rhythm  Heart sounds: No murmur heard  Pulmonary:      Effort: Pulmonary effort is normal  No respiratory distress  Breath sounds: Normal breath sounds  No wheezing or rales  Abdominal:      General: Bowel sounds are normal       Palpations: Abdomen is soft  Tenderness: There is no abdominal tenderness  There is no guarding  Musculoskeletal:         General: No deformity  Cervical back: Normal range of motion and neck supple  Right lower leg: No edema  Left lower leg: No edema  Lymphadenopathy:      Cervical: No cervical adenopathy  Skin:     Findings: No rash  Neurological:      Mental Status: She is alert and oriented to person, place, and time  Comments: Phalen's sign with right middle and ring finger tingling  Negative Tinel's sign        Psychiatric:         Mood and Affect: Mood normal             ALLERGIES:  No Known Allergies    Current Medications     Current Outpatient Medications   Medication Sig Dispense Refill    norgestimate-ethinyl estradiol (ORTHO TRI-CYCLEN LO) 0 18/0 215/0 25 MG-25 MCG per tablet Take 1 tablet by mouth daily 28 tablet 12    sertraline (ZOLOFT) 25 mg tablet Take 1 tablet (25 mg total) by mouth daily In addition to 50 mg 90 tablet 1    sertraline (ZOLOFT) 50 mg tablet Take 1 tablet (50 mg total) by mouth every morning In addition to 25 mg to equal 75 mg daily  90 tablet 1    traZODone (DESYREL) 50 mg tablet Take 1 tablet (50 mg total) by mouth daily at bedtime 90 tablet 1    VITAMIN D PO Take 1 capsule by mouth daily      clindamycin-benzoyl peroxide (BENZACLIN) gel Apply topically 2 (two) times a day 25 g 1     No current facility-administered medications for this visit           Health Maintenance     Health Maintenance   Topic Date Due    Annual Physical  08/06/2021    Influenza Vaccine (1) 09/01/2021    Chlamydia Screening  08/06/2021    Cervical Cancer Screening  07/09/2022    BMI: Adult  07/26/2022    Depression Remission PHQ  07/26/2022    DTaP,Tdap,and Td Vaccines (7 - Td or Tdap) 12/03/2024    HIV Screening  Completed    Hepatitis C Screening  Completed    HIB Vaccine  Completed    Hepatitis B Vaccine  Completed    IPV Vaccine  Completed    HPV Vaccine  Completed    COVID-19 Vaccine  Completed    Pneumococcal Vaccine: Pediatrics (0 to 5 Years) and At-Risk Patients (6 to 59 Years)  Aged Out    Hepatitis A Vaccine  Aged Out    Meningococcal ACWY Vaccine  Aged [employer] History   Administered Date(s) Administered    DTaP 5 02/02/1997, 04/07/1997, 06/06/1997, 03/24/1998, 03/11/2002    HPV9 07/30/2019, 10/14/2019, 02/07/2020    Hep B, adult 1996, 02/07/1997, 12/05/1997    Hib (PRP-OMP) 02/07/1997, 04/07/1997, 06/06/1997, 03/24/1998    INFLUENZA 11/17/2020    IPV 02/07/1997, 04/07/1997, 06/06/1997, 03/11/2002    Influenza, recombinant, quadrivalent,injectable, preservative free 02/14/2020    MMR 03/24/1998, 12/26/2000    Meningococcal, Unknown Serogroups 02/23/2012    SARS-CoV-2 / COVID-19 mRNA IM (Tevin Blush) 04/26/2021, 05/24/2021    Tdap 12/03/2014    Varicella 03/30/1999, 01/24/2013       CONSTANCE Solis

## 2021-09-07 DIAGNOSIS — Z30.011 ENCOUNTER FOR INITIAL PRESCRIPTION OF CONTRACEPTIVE PILLS: ICD-10-CM

## 2021-09-07 RX ORDER — NORGESTIMATE AND ETHINYL ESTRADIOL 7DAYSX3 LO
1 KIT ORAL DAILY
Qty: 28 TABLET | Refills: 0 | Status: SHIPPED | OUTPATIENT
Start: 2021-09-07 | End: 2021-09-29

## 2021-09-20 ENCOUNTER — TELEPHONE (OUTPATIENT)
Dept: PSYCHIATRY | Facility: CLINIC | Age: 26
End: 2021-09-20

## 2021-09-29 ENCOUNTER — ANNUAL EXAM (OUTPATIENT)
Dept: OBGYN CLINIC | Facility: CLINIC | Age: 26
End: 2021-09-29
Payer: COMMERCIAL

## 2021-09-29 VITALS
BODY MASS INDEX: 23.7 KG/M2 | WEIGHT: 138.8 LBS | HEIGHT: 64 IN | SYSTOLIC BLOOD PRESSURE: 112 MMHG | DIASTOLIC BLOOD PRESSURE: 74 MMHG

## 2021-09-29 DIAGNOSIS — Z30.41 ENCOUNTER FOR SURVEILLANCE OF CONTRACEPTIVE PILLS: ICD-10-CM

## 2021-09-29 DIAGNOSIS — Z30.011 ENCOUNTER FOR INITIAL PRESCRIPTION OF CONTRACEPTIVE PILLS: ICD-10-CM

## 2021-09-29 DIAGNOSIS — Z01.419 ENCOUNTER FOR GYNECOLOGICAL EXAMINATION (GENERAL) (ROUTINE) WITHOUT ABNORMAL FINDINGS: Primary | ICD-10-CM

## 2021-09-29 PROCEDURE — 0503F POSTPARTUM CARE VISIT: CPT | Performed by: NURSE PRACTITIONER

## 2021-09-29 PROCEDURE — 99395 PREV VISIT EST AGE 18-39: CPT | Performed by: NURSE PRACTITIONER

## 2021-09-29 RX ORDER — NORGESTIMATE AND ETHINYL ESTRADIOL
KIT
Qty: 28 TABLET | Refills: 0 | Status: SHIPPED | OUTPATIENT
Start: 2021-09-29 | End: 2021-09-29 | Stop reason: SDUPTHER

## 2021-09-29 RX ORDER — NORGESTIMATE AND ETHINYL ESTRADIOL 7DAYSX3 LO
1 KIT ORAL DAILY
Qty: 84 TABLET | Refills: 3 | Status: SHIPPED | OUTPATIENT
Start: 2021-09-29

## 2021-09-29 NOTE — PROGRESS NOTES
Encounter for gynecological examination (general) (routine) without abnormal findings    Normal findings on routine annual gyn exam  Recommended monthly SBE, annual CBE  Reviewed ASCCP guidelines and pap noted to be up to date  The patient reports she has completed Gardasil series  STI testing was offered and declined at this time; the patient is aware that condoms are recommended for all sexual contact for prevention of STI  The patient likes current contraceptive measure and desires to continue  Reviewed diet/activity recommendations and reasons to call  F/u in one year for routine annual gyn exam or sooner PRN  Encounter for surveillance of contraceptive pills    Happy with current OCP  Denies ACHES, breakthrough bleeding, nausea or other side effects  Refill given for one year  RTO 1 year or prn problems or concerns  Diagnoses and all orders for this visit:    Encounter for gynecological examination (general) (routine) without abnormal findings    Encounter for surveillance of contraceptive pills  -     norgestimate-ethinyl estradiol (Tri-Lo-Berta) 0 18/0 215/0 25 MG-25 MCG per tablet; Take 1 tablet by mouth daily         Batool Gorman  1995    CC:  Yearly exam    S:  Batool Gorman is a 26 y o  female here for yearly exam  She denies breast concerns, abdominal/pelvic pain, abnormal vaginal discharge or bladder/bowel dysfunction  Her cycles are regular, not heavy or painful on OCP  Sexual activity: She is not currently sexually active  Contraception:  She uses OCP  for contraception  STD testing:  She does not request STD testing today  Gardasil:  She has had the Gardasil series         Last Pap: 9/19 negative    Family hx of breast cancer: maternal aunt  Family hx of ovarian cancer: denies  Family hx of colon cancer:  Father (needs early screening)    She works in customer service       Current Outpatient Medications:     clindamycin-benzoyl peroxide (BENZACLIN) gel, Apply topically 2 (two) times a day, Disp: 25 g, Rfl: 1    sertraline (ZOLOFT) 25 mg tablet, Take 1 tablet (25 mg total) by mouth daily In addition to 50 mg, Disp: 90 tablet, Rfl: 1    sertraline (ZOLOFT) 50 mg tablet, Take 1 tablet (50 mg total) by mouth every morning In addition to 25 mg to equal 75 mg daily  , Disp: 90 tablet, Rfl: 1    traZODone (DESYREL) 50 mg tablet, Take 1 tablet (50 mg total) by mouth daily at bedtime, Disp: 90 tablet, Rfl: 1    VITAMIN D PO, Take 1 capsule by mouth daily, Disp: , Rfl:     norgestimate-ethinyl estradiol (Tri-Lo-Berta) 0 18/0 215/0 25 MG-25 MCG per tablet, Take 1 tablet by mouth daily, Disp: 84 tablet, Rfl: 3  Social History     Socioeconomic History    Marital status: Single     Spouse name: Not on file    Number of children: Not on file    Years of education: Not on file    Highest education level: Not on file   Occupational History    Not on file   Tobacco Use    Smoking status: Never Smoker    Smokeless tobacco: Never Used   Vaping Use    Vaping Use: Never used   Substance and Sexual Activity    Alcohol use: Yes     Comment: socially     Drug use: Never    Sexual activity: Not Currently     Birth control/protection: OCP   Other Topics Concern    Not on file   Social History Narrative    Not on file     Social Determinants of Health     Financial Resource Strain:     Difficulty of Paying Living Expenses:    Food Insecurity:     Worried About Running Out of Food in the Last Year:     Ran Out of Food in the Last Year:    Transportation Needs:     Lack of Transportation (Medical):      Lack of Transportation (Non-Medical):    Physical Activity:     Days of Exercise per Week:     Minutes of Exercise per Session:    Stress:     Feeling of Stress :    Social Connections:     Frequency of Communication with Friends and Family:     Frequency of Social Gatherings with Friends and Family:     Attends Congregational Services:     Active Member of Clubs or Organizations:     Attends Club or Organization Meetings:     Marital Status:    Intimate Partner Violence:     Fear of Current or Ex-Partner:     Emotionally Abused:     Physically Abused:     Sexually Abused:      Family History   Problem Relation Age of Onset    No Known Problems Mother     Farmer's esophagus Father     Colon cancer Father     Allergies Sister     Hyperthyroidism Maternal Grandmother     Hyperthyroidism Paternal Grandmother     Other Paternal Grandmother         CABG x4    Coronary artery disease Paternal Grandfather     Breast cancer Maternal Aunt     Bipolar disorder Maternal Uncle     Ovarian cancer Neg Hx     Uterine cancer Neg Hx     Cervical cancer Neg Hx      Past Medical History:   Diagnosis Date    Anxiety     Pneumonia      Review of Systems   Constitutional: Negative for appetite change, fatigue and unexpected weight change  Respiratory: Negative for shortness of breath  Cardiovascular: Negative for chest pain and leg swelling  Breasts:  negative for tenderness or masses  Gastrointestinal: Negative for abdominal pain  Endocrine: Negative for cold intolerance and heat intolerance  Genitourinary: Negative for dysuria, flank pain, frequency, genital sores, hematuria, menstrual problem and pelvic pain  Musculoskeletal: Negative for back pain  Neurological: Negative for headaches  O:  Blood pressure 112/74, height 5' 4" (1 626 m), weight 63 kg (138 lb 12 8 oz), last menstrual period 09/04/2021, not currently breastfeeding  Patient appears well and is not in distress  ROM normal   Neck is supple without masses  Normal thyroid  Heart regular rate and rhythm  Lungs CTA bilaterally   Breasts are symmetrical without mass, tenderness, nipple discharge, skin changes or adenopathy  Abdomen is soft and nontender without masses  External genitals are normal without lesions or rashes    Urethral meatus and urethra are normal  Bladder is normal to palpation  Vagina is normal without discharge or bleeding  Cervix is normal without discharge or lesion  Patient very tense and uncomfortable during speculum and bimanual exam  Uterus is normal, mobile, nontender without palpable mass  Adnexa are normal, nontender, without palpable mass     Skin warm and dry  Capillary refill < 2 seconds  Alert and oriented x 3 with normal affect

## 2021-09-29 NOTE — ASSESSMENT & PLAN NOTE
Normal findings on routine annual gyn exam  Recommended monthly SBE, annual CBE  Reviewed ASCCP guidelines and pap noted to be up to date  The patient reports she has completed Gardasil series  STI testing was offered and declined at this time; the patient is aware that condoms are recommended for all sexual contact for prevention of STI  The patient likes current contraceptive measure and desires to continue  Reviewed diet/activity recommendations and reasons to call  F/u in one year for routine annual gyn exam or sooner PRN

## 2021-09-29 NOTE — PATIENT INSTRUCTIONS
Breast Self Exam for Women   AMBULATORY CARE:   A breast self-exam (BSE)  is a way to check your breasts for lumps and other changes  Regular BSEs can help you know how your breasts normally look and feel  Most breast lumps or changes are not cancer, but you should always have them checked by a healthcare provider  Why you should do a BSE:  Breast cancer is the most common type of cancer in women  Even if you have mammograms, you may still want to do a BSE regularly  If you know how your breasts normally feel and look, it may help you know when to contact your healthcare provider  Mammograms can miss some cancers  You may find a lump during a BSE that did not show up on a mammogram   When you should do a BSE:  If you have periods, you may want to do your BSE 1 week after your period ends  This is the time when your breasts may be the least swollen, lumpy, or tender  You can do regular BSEs even if you are breastfeeding or have breast implants  Call your doctor if:   · You find any lumps or changes in your breasts  · You have breast pain or fluid coming from your nipples  · You have questions or concerns about your condition or care  How to do a BSE:       · Look at your breasts in a mirror  Look at the size and shape of each breast and nipple  Check for swelling, lumps, dimpling, scaly skin, or other skin changes  Look for nipple changes, such as a nipple that is painful or beginning to pull inward  Gently squeeze both nipples and check to see if fluid (that is not breast milk) comes out of them  If you find any of these or other breast changes, contact your healthcare provider  Check your breasts while you sit or  the following 3 positions:    ? Hang your arms down at your sides  ? Raise your hands and join them behind your head  ? Put firm pressure with your hands on your hips  Bend slightly forward while you look at your breasts in the mirror  · Lie down and feel your breasts    When you lie down, your breast tissue spreads out evenly over your chest  This makes it easier for you to feel for lumps and anything that may not be normal for your breasts  Do a BSE on one breast at a time  ? Place a small pillow or towel under your left shoulder  Put your left arm behind your head  ? Use the 3 middle fingers of your right hand  Use your fingertip pads, on the top of your fingers  Your fingertip pad is the most sensitive part of your finger  ? Use small circles to feel your breast tissue  Use your fingertip pads to make dime-sized, overlapping circles on your breast and armpits  Use light, medium, and firm pressure  First, press lightly  Second, press with medium pressure to feel a little deeper into the breast  Last, use firm pressure to feel deep within your breast     ? Examine your entire breast area  Examine the breast area from above the breast to below the breast where you feel only ribs  Make small circles with your fingertips, starting in the middle of your armpit  Make circles going up and down the breast area  Continue toward your breast and all the way across it  Examine the area from your armpit all the way over to the middle of your chest (breastbone)  Stop at the middle of your chest     ? Move the pillow or towel to your right shoulder, and put your right arm behind your head  Use the 3 fingertip pads of your left hand, and repeat the above steps to do a BSE on your right breast   What else you can do to check for breast problems or cancer:  Talk to your healthcare provider about mammograms  A mammogram is an x-ray of your breasts to screen for breast cancer or other problems  Your provider can tell you the benefits and risks of mammograms  The first mammogram is usually at age 39 or 48  Your provider may recommend you start at 36 or younger if your risk for breast cancer is high  Mammograms usually continue every 1 to 2 years until age 76       Follow up with your doctor as directed:  Write down your questions so you remember to ask them during your visits  © Copyright [employer] 2021 Information is for End User's use only and may not be sold, redistributed or otherwise used for commercial purposes  All illustrations and images included in CareNotes® are the copyrighted property of [employer]  or [employer]  The above information is an  only  It is not intended as medical advice for individual conditions or treatments  Talk to your doctor, nurse or pharmacist before following any medical regimen to see if it is safe and effective for you

## 2021-10-29 ENCOUNTER — OFFICE VISIT (OUTPATIENT)
Dept: FAMILY MEDICINE CLINIC | Facility: CLINIC | Age: 26
End: 2021-10-29
Payer: COMMERCIAL

## 2021-10-29 VITALS
WEIGHT: 140.6 LBS | BODY MASS INDEX: 24.01 KG/M2 | HEIGHT: 64 IN | DIASTOLIC BLOOD PRESSURE: 80 MMHG | HEART RATE: 90 BPM | SYSTOLIC BLOOD PRESSURE: 110 MMHG | RESPIRATION RATE: 16 BRPM | TEMPERATURE: 97.8 F | OXYGEN SATURATION: 98 %

## 2021-10-29 DIAGNOSIS — G56.01 CARPAL TUNNEL SYNDROME OF RIGHT WRIST: Primary | ICD-10-CM

## 2021-10-29 DIAGNOSIS — Z23 INFLUENZA VACCINE NEEDED: ICD-10-CM

## 2021-10-29 PROCEDURE — 99213 OFFICE O/P EST LOW 20 MIN: CPT | Performed by: NURSE PRACTITIONER

## 2021-10-29 PROCEDURE — 90471 IMMUNIZATION ADMIN: CPT | Performed by: NURSE PRACTITIONER

## 2021-10-29 PROCEDURE — 90686 IIV4 VACC NO PRSV 0.5 ML IM: CPT | Performed by: NURSE PRACTITIONER

## 2021-12-06 ENCOUNTER — IMMUNIZATIONS (OUTPATIENT)
Dept: FAMILY MEDICINE CLINIC | Facility: HOSPITAL | Age: 26
End: 2021-12-06

## 2021-12-06 DIAGNOSIS — Z23 ENCOUNTER FOR IMMUNIZATION: Primary | ICD-10-CM

## 2021-12-06 PROCEDURE — 0001A COVID-19 PFIZER VACC 0.3 ML: CPT

## 2021-12-06 PROCEDURE — 91300 COVID-19 PFIZER VACC 0.3 ML: CPT

## 2021-12-14 DIAGNOSIS — L70.0 ACNE VULGARIS: ICD-10-CM

## 2021-12-14 RX ORDER — CLINDAMYCIN AND BENZOYL PEROXIDE 10; 50 MG/G; MG/G
GEL TOPICAL
Qty: 25 G | Refills: 1 | Status: SHIPPED | OUTPATIENT
Start: 2021-12-14

## 2021-12-23 ENCOUNTER — OFFICE VISIT (OUTPATIENT)
Dept: URGENT CARE | Facility: CLINIC | Age: 26
End: 2021-12-23
Payer: COMMERCIAL

## 2021-12-23 VITALS
RESPIRATION RATE: 18 BRPM | OXYGEN SATURATION: 99 % | HEIGHT: 63 IN | TEMPERATURE: 99.1 F | HEART RATE: 100 BPM | BODY MASS INDEX: 23.92 KG/M2 | WEIGHT: 135 LBS

## 2021-12-23 DIAGNOSIS — Z11.59 SPECIAL SCREENING EXAMINATION FOR VIRAL DISEASE: Primary | ICD-10-CM

## 2021-12-23 PROCEDURE — 99213 OFFICE O/P EST LOW 20 MIN: CPT | Performed by: PHYSICIAN ASSISTANT

## 2021-12-23 PROCEDURE — U0005 INFEC AGEN DETEC AMPLI PROBE: HCPCS | Performed by: PHYSICIAN ASSISTANT

## 2021-12-23 PROCEDURE — U0003 INFECTIOUS AGENT DETECTION BY NUCLEIC ACID (DNA OR RNA); SEVERE ACUTE RESPIRATORY SYNDROME CORONAVIRUS 2 (SARS-COV-2) (CORONAVIRUS DISEASE [COVID-19]), AMPLIFIED PROBE TECHNIQUE, MAKING USE OF HIGH THROUGHPUT TECHNOLOGIES AS DESCRIBED BY CMS-2020-01-R: HCPCS | Performed by: PHYSICIAN ASSISTANT

## 2021-12-24 LAB — SARS-COV-2 RNA RESP QL NAA+PROBE: NEGATIVE

## 2022-01-06 ENCOUNTER — OFFICE VISIT (OUTPATIENT)
Dept: URGENT CARE | Age: 27
End: 2022-01-06
Payer: COMMERCIAL

## 2022-01-06 VITALS
OXYGEN SATURATION: 99 % | HEART RATE: 95 BPM | RESPIRATION RATE: 16 BRPM | WEIGHT: 135 LBS | TEMPERATURE: 98 F | HEIGHT: 63 IN | BODY MASS INDEX: 23.92 KG/M2

## 2022-01-06 DIAGNOSIS — Z11.52 ENCOUNTER FOR SCREENING FOR COVID-19: Primary | ICD-10-CM

## 2022-01-06 PROCEDURE — 87636 SARSCOV2 & INF A&B AMP PRB: CPT | Performed by: NURSE PRACTITIONER

## 2022-01-06 PROCEDURE — 99213 OFFICE O/P EST LOW 20 MIN: CPT | Performed by: NURSE PRACTITIONER

## 2022-01-07 NOTE — PROGRESS NOTES
[id number]        NAME: Khang Brito is a 23 y o  female  : 1995    MRN: [id number]  DATE: 2022  TIME: 8:49 PM    Assessment and Plan   Encounter for screening for COVID-19 [Z11 52]  1  Encounter for screening for COVID-19  Covid19 and INFLUENZA A/B PCR         Patient Instructions     Covid tested; results in 1-2 days  Stay quarantined   Robitussin for congestion   Follow up with PCP in 3-5 days  Proceed to  ER if symptoms worsen  Chief Complaint     Chief Complaint   Patient presents with    Nasal Congestion     x 2 wks    Dizziness     started today    Cough         History of Present Illness       HPI   Reports congestion in the head, which "feels like cotton", and making her dizzy  Also cough and nasal congestion  Requesting covid testing     Review of Systems   Review of Systems   Constitutional: Negative for chills and fever  HENT: Positive for congestion and rhinorrhea  Negative for sore throat and trouble swallowing  Respiratory: Positive for cough  Negative for chest tightness, shortness of breath and wheezing  Cardiovascular: Negative for chest pain  Gastrointestinal: Negative for diarrhea and vomiting  Neurological: Positive for light-headedness  Current Medications       Current Outpatient Medications:     clindamycin-benzoyl peroxide (BENZACLIN) gel, APPLY TO AFFECTED AREA TWICE A DAY, Disp: 25 g, Rfl: 1    norgestimate-ethinyl estradiol (Tri-Lo-Berta) 0 18/0 215/0 25 MG-25 MCG per tablet, Take 1 tablet by mouth daily, Disp: 84 tablet, Rfl: 3    sertraline (ZOLOFT) 25 mg tablet, Take 1 tablet (25 mg total) by mouth daily In addition to 50 mg, Disp: 90 tablet, Rfl: 1    sertraline (ZOLOFT) 50 mg tablet, Take 1 tablet (50 mg total) by mouth every morning In addition to 25 mg to equal 75 mg daily  , Disp: 90 tablet, Rfl: 1    traZODone (DESYREL) 50 mg tablet, Take 1 tablet (50 mg total) by mouth daily at bedtime, Disp: 90 tablet, Rfl: 1   VITAMIN D PO, Take 1 capsule by mouth daily, Disp: , Rfl:     Current Allergies     Allergies as of 01/06/2022    (No Known Allergies)            The following portions of the patient's history were reviewed and updated as appropriate: allergies, current medications, past family history, past medical history, past social history, past surgical history and problem list      Past Medical History:   Diagnosis Date    Anxiety     Pneumonia        Past Surgical History:   Procedure Laterality Date    NO PAST SURGERIES         Family History   Problem Relation Age of Onset    No Known Problems Mother     Farmer's esophagus Father     Colon cancer Father     Allergies Sister     Hyperthyroidism Maternal Grandmother     Hyperthyroidism Paternal Grandmother     Other Paternal Grandmother         CABG x4    Coronary artery disease Paternal Grandfather     Breast cancer Maternal Aunt     Bipolar disorder Maternal Uncle     Ovarian cancer Neg Hx     Uterine cancer Neg Hx     Cervical cancer Neg Hx          Medications have been verified  Objective   Pulse 95   Temp 98 °F (36 7 °C)   Resp 16   Ht 5' 3" (1 6 m)   Wt 61 2 kg (135 lb)   SpO2 99%   BMI 23 91 kg/m²   No LMP recorded  (Menstrual status: Birth Control)  Physical Exam     Physical Exam  Constitutional:       Appearance: She is not ill-appearing or diaphoretic  HENT:      Right Ear: Tympanic membrane and ear canal normal       Left Ear: Tympanic membrane and ear canal normal       Nose: No rhinorrhea  Mouth/Throat:      Mouth: Mucous membranes are moist       Pharynx: No posterior oropharyngeal erythema  Cardiovascular:      Rate and Rhythm: Regular rhythm  Heart sounds: Normal heart sounds  Pulmonary:      Effort: Pulmonary effort is normal       Breath sounds: Normal breath sounds  No wheezing

## 2022-01-09 LAB
FLUAV RNA RESP QL NAA+PROBE: NEGATIVE
FLUBV RNA RESP QL NAA+PROBE: NEGATIVE
SARS-COV-2 RNA RESP QL NAA+PROBE: NEGATIVE

## 2022-01-13 ENCOUNTER — OFFICE VISIT (OUTPATIENT)
Dept: FAMILY MEDICINE CLINIC | Facility: CLINIC | Age: 27
End: 2022-01-13

## 2022-01-13 VITALS
RESPIRATION RATE: 16 BRPM | SYSTOLIC BLOOD PRESSURE: 120 MMHG | WEIGHT: 141 LBS | TEMPERATURE: 98.4 F | DIASTOLIC BLOOD PRESSURE: 80 MMHG | OXYGEN SATURATION: 98 % | HEIGHT: 63 IN | HEART RATE: 79 BPM | BODY MASS INDEX: 24.98 KG/M2

## 2022-01-13 DIAGNOSIS — J32.9 SINUSITIS, UNSPECIFIED CHRONICITY, UNSPECIFIED LOCATION: Primary | ICD-10-CM

## 2022-01-13 PROCEDURE — 99213 OFFICE O/P EST LOW 20 MIN: CPT | Performed by: NURSE PRACTITIONER

## 2022-01-13 RX ORDER — AMOXICILLIN AND CLAVULANATE POTASSIUM 875; 125 MG/1; MG/1
1 TABLET, FILM COATED ORAL EVERY 12 HOURS SCHEDULED
Qty: 14 TABLET | Refills: 0 | Status: SHIPPED | OUTPATIENT
Start: 2022-01-13 | End: 2022-01-20

## 2022-01-13 NOTE — PROGRESS NOTES
FAMILY PRACTICE OFFICE VISIT       NAME: Maurice Scott  AGE: 23 y o  SEX: female       : 1995        MRN: [id number]    Assessment and Plan   1  Sinusitis, unspecified chronicity, unspecified location  -     amoxicillin-clavulanate (AUGMENTIN) 875-125 mg per tablet; Take 1 tablet by mouth every 12 (twelve) hours for 7 days    Start Augmentin  Take this medication with food  Call if symptoms not improving over the next 4-5 days  Chief Complaint     Chief Complaint   Patient presents with    Cold Like Symptoms     sinus congestion, lightheaded  1 + wk       History of Present Illness     Maurice Scott is a 23year old female presenting for URI symptoms for the past few weeks  Started with a cold end of December  Still has some nasal congetion  Ears feel good  Not aware of PND  No cough  No headaches  Still has facial sinus pressure  Had 2 negative PCR COVID-19 tests  Negative flu swab  Head feels foggy  Dizziness at times  Review of Systems   Review of Systems   Constitutional: Negative for chills, diaphoresis, fatigue and fever  HENT: Positive for congestion, ear pain and sinus pressure  Negative for postnasal drip, rhinorrhea, sneezing and sore throat  Respiratory: Negative for cough, chest tightness, shortness of breath and wheezing  Cardiovascular: Negative for chest pain, palpitations and leg swelling  Gastrointestinal: Negative  Neurological: Positive for dizziness  Negative for headaches  I have reviewed the patient's medical history in detail; there are no changes to the history as noted in the electronic medical record      Objective     Vitals:    22 1057   BP: 120/80   Pulse: 79   Resp: 16   Temp: 98 4 °F (36 9 °C)   TempSrc: Temporal   SpO2: 98%   Weight: 64 kg (141 lb)   Height: 5' 3" (1 6 m)     Wt Readings from Last 3 Encounters:   22 64 kg (141 lb)   22 61 2 kg (135 lb)   21 61 2 kg (135 lb)     Physical Exam  Vitals and nursing note reviewed  Constitutional:       General: She is not in acute distress  Appearance: Normal appearance  She is not ill-appearing  HENT:      Head: Normocephalic and atraumatic  Right Ear: Tympanic membrane and ear canal normal       Left Ear: Tympanic membrane and ear canal normal       Nose:      Comments: Nasal turbinates swollen and red  Mouth/Throat:      Mouth: Mucous membranes are moist       Pharynx: No oropharyngeal exudate or posterior oropharyngeal erythema  Comments: Thick yellow post nasal drip  Eyes:      Conjunctiva/sclera: Conjunctivae normal    Cardiovascular:      Rate and Rhythm: Normal rate and regular rhythm  Heart sounds: No murmur heard  Pulmonary:      Effort: Pulmonary effort is normal       Breath sounds: Normal breath sounds  Musculoskeletal:      Cervical back: Normal range of motion and neck supple  Right lower leg: No edema  Left lower leg: No edema  Lymphadenopathy:      Cervical: No cervical adenopathy  Neurological:      Mental Status: She is alert  Psychiatric:         Mood and Affect: Mood normal             ALLERGIES:  No Known Allergies    Current Medications     Current Outpatient Medications   Medication Sig Dispense Refill    clindamycin-benzoyl peroxide (BENZACLIN) gel APPLY TO AFFECTED AREA TWICE A DAY 25 g 1    norgestimate-ethinyl estradiol (Tri-Lo-Berta) 0 18/0 215/0 25 MG-25 MCG per tablet Take 1 tablet by mouth daily 84 tablet 3    sertraline (ZOLOFT) 25 mg tablet Take 1 tablet (25 mg total) by mouth daily In addition to 50 mg 90 tablet 1    sertraline (ZOLOFT) 50 mg tablet Take 1 tablet (50 mg total) by mouth every morning In addition to 25 mg to equal 75 mg daily   90 tablet 1    traZODone (DESYREL) 50 mg tablet Take 1 tablet (50 mg total) by mouth daily at bedtime 90 tablet 1    VITAMIN D PO Take 1 capsule by mouth daily      amoxicillin-clavulanate (AUGMENTIN) 875-125 mg per tablet Take 1 tablet by mouth every 12 (twelve) hours for 7 days 14 tablet 0     No current facility-administered medications for this visit           Health Maintenance     Health Maintenance   Topic Date Due    Cervical Cancer Screening  07/09/2022    Depression Remission PHQ  07/26/2022    Annual Physical  09/29/2022    BMI: Adult  01/13/2023    DTaP,Tdap,and Td Vaccines (7 - Td or Tdap) 12/03/2024    HIV Screening  Completed    Hepatitis C Screening  Completed    HIB Vaccine  Completed    Hepatitis B Vaccine  Completed    IPV Vaccine  Completed    Influenza Vaccine  Completed    HPV Vaccine  Completed    COVID-19 Vaccine  Completed    Pneumococcal Vaccine: Pediatrics (0 to 5 Years) and At-Risk Patients (6 to 59 Years)  Aged Out    Hepatitis A Vaccine  Aged Out    Meningococcal ACWY Vaccine  Aged [employer] History   Administered Date(s) Administered    COVID-19 MODERNA VACC 0 5 ML IM 04/26/2021, 05/24/2021    COVID-19 PFIZER VACCINE 0 3 ML IM 12/06/2021    DTaP 5 02/02/1997, 04/07/1997, 06/06/1997, 03/24/1998, 03/11/2002    HPV9 07/30/2019, 10/14/2019, 02/07/2020    Hep B, adult 1995, 02/07/1997, 12/05/1997    Hib (PRP-OMP) 02/07/1997, 04/07/1997, 06/06/1997, 03/24/1998    INFLUENZA 11/17/2020    IPV 02/07/1997, 04/07/1997, 06/06/1997, 03/11/2002    Influenza, injectable, quadrivalent, preservative free 0 5 mL 10/29/2021    Influenza, recombinant, quadrivalent,injectable, preservative free 02/14/2020    MMR 03/24/1998, 12/26/2000    Meningococcal, Unknown Serogroups 02/23/2012    Tdap 12/03/2014    Varicella 03/30/1999, 01/24/2013       CONSTANCE Perez

## 2022-01-13 NOTE — LETTER
January 13, 2022     Patient: Cynthia Saeed   YOB: 1995   Date of Visit: 1/13/2022       To Whom it May Concern:    Cynthia Saeed is under my professional care  She was seen in my office on 1/13/2022  She may return to work on 01/14/2022  If you have any questions or concerns, please don't hesitate to call           Sincerely,          CONSTANCE Leo        CC: No Recipients

## 2022-01-20 ENCOUNTER — TELEPHONE (OUTPATIENT)
Dept: FAMILY MEDICINE CLINIC | Facility: CLINIC | Age: 27
End: 2022-01-20

## 2022-01-20 NOTE — TELEPHONE ENCOUNTER
Dizziness can be a rare side effect of Augmentin  She did not state if dizziness is mild or more significant  If mild, please finish Augmentin and monitor if dizziness resolves when Augmentin is finished  Dizziness can also be present with sinus infection  If dizziness does not resolve when she stops taking Augmentin, she needs to let me know  If dizziness is more severe, she needs to let me know

## 2022-01-20 NOTE — TELEPHONE ENCOUNTER
Patient called, stated that she has been taking augmentin as prescribed, she stated that last 2 days she has been dizzy, she did state that she has about 2-3 days left of the medication, she also stated that her cough & congestion is going away

## 2022-01-24 DIAGNOSIS — F33.41 RECURRENT MAJOR DEPRESSIVE DISORDER, IN PARTIAL REMISSION (HCC): ICD-10-CM

## 2022-01-25 RX ORDER — TRAZODONE HYDROCHLORIDE 50 MG/1
TABLET ORAL
Qty: 90 TABLET | Refills: 1 | Status: SHIPPED | OUTPATIENT
Start: 2022-01-25 | End: 2022-07-22

## 2022-01-25 RX ORDER — SERTRALINE HYDROCHLORIDE 25 MG/1
25 TABLET, FILM COATED ORAL DAILY
Qty: 90 TABLET | Refills: 1 | Status: SHIPPED | OUTPATIENT
Start: 2022-01-25 | End: 2022-07-22

## 2022-02-11 ENCOUNTER — TELEPHONE (OUTPATIENT)
Dept: FAMILY MEDICINE CLINIC | Facility: CLINIC | Age: 27
End: 2022-02-11

## 2022-02-11 NOTE — TELEPHONE ENCOUNTER
Try taking 1 5 trazodone tablets before bed  If this does not work, please ask her to schedule follow up appt  Thank you

## 2022-02-11 NOTE — TELEPHONE ENCOUNTER
Patient called, stated that the last 2 nights she hasn't been sleeping, she stated that she is currently taking trazodone, she stated that is normally works, she wanted to know if there is something she can do

## 2022-02-13 DIAGNOSIS — F33.41 RECURRENT MAJOR DEPRESSIVE DISORDER, IN PARTIAL REMISSION (HCC): ICD-10-CM

## 2022-04-28 ENCOUNTER — OFFICE VISIT (OUTPATIENT)
Dept: URGENT CARE | Facility: MEDICAL CENTER | Age: 27
End: 2022-04-28
Payer: COMMERCIAL

## 2022-04-28 VITALS
HEIGHT: 63 IN | HEART RATE: 72 BPM | TEMPERATURE: 97.3 F | BODY MASS INDEX: 23.92 KG/M2 | RESPIRATION RATE: 20 BRPM | WEIGHT: 135 LBS | DIASTOLIC BLOOD PRESSURE: 90 MMHG | SYSTOLIC BLOOD PRESSURE: 135 MMHG | OXYGEN SATURATION: 99 %

## 2022-04-28 DIAGNOSIS — H61.22 IMPACTED CERUMEN OF LEFT EAR: Primary | ICD-10-CM

## 2022-04-28 PROCEDURE — 99212 OFFICE O/P EST SF 10 MIN: CPT | Performed by: PHYSICIAN ASSISTANT

## 2022-04-28 PROCEDURE — 69210 REMOVE IMPACTED EAR WAX UNI: CPT | Performed by: PHYSICIAN ASSISTANT

## 2022-04-28 NOTE — PROGRESS NOTES
[facility]        NAME: Jose Martin Rizo is a 23 y o  female  : 1995    MRN: [id number]  DATE: 2022  TIME: 5:06 PM    Assessment and Plan   Impacted cerumen of left ear [H61 22]  1  Impacted cerumen of left ear  Ambulatory Referral to Otolaryngology     Ear cerumen removal    Date/Time: 2022 5:07 PM  Performed by: Thao Machado PA-C  Authorized by: Thao Machado PA-C   Universal Protocol:  Consent: Verbal consent obtained  Risks and benefits: risks, benefits and alternatives were discussed  Consent given by: patient  Patient identity confirmed: verbally with patient      Procedure details:     Local anesthetic:  None    Location:  L ear    Procedure type: irrigation with instrumentation      Instrumentation: curette    Post-procedure details:     Complication:  None    Hearing quality:  Improved    Patient tolerance of procedure: Tolerated well, no immediate complications        Patient Instructions       Patient was educated on cerumen impaction  Patient was educated to keep ears clean and dry  Patient was told if symptoms persist follow up with ENT Or PCP  Chief Complaint     Chief Complaint   Patient presents with    Cerumen Impaction     left ear feels uncomfortable  has history of excessive ear wax  History of Present Illness       Patient is here today complaining of left ear pain and decreased hearing  Patient reports he has a history of cerumen impaction  Denies any allergies to medications  Review of Systems   Review of Systems   Constitutional: Negative  HENT: Positive for ear pain  Respiratory: Negative  Cardiovascular: Negative  Psychiatric/Behavioral: Negative            Current Medications       Current Outpatient Medications:     clindamycin-benzoyl peroxide (BENZACLIN) gel, APPLY TO AFFECTED AREA TWICE A DAY, Disp: 25 g, Rfl: 1    norgestimate-ethinyl estradiol (Tri-Lo-Berta) 0 18/0 215/0 25 MG-25 MCG per tablet, Take 1 tablet by mouth daily, Disp: 84 tablet, Rfl: 3    sertraline (ZOLOFT) 25 mg tablet, TAKE 1 TABLET (25 MG TOTAL) BY MOUTH DAILY IN ADDITION TO 50 MG, Disp: 90 tablet, Rfl: 1    sertraline (ZOLOFT) 50 mg tablet, TAKE 1 TABLET (50 MG TOTAL) BY MOUTH EVERY MORNING IN ADDITION TO 25 MG TO EQUAL 75 MG DAILY  , Disp: 90 tablet, Rfl: 1    traZODone (DESYREL) 50 mg tablet, TAKE 1 TABLET BY MOUTH DAILY AT BEDTIME, Disp: 90 tablet, Rfl: 1    VITAMIN D PO, Take 1 capsule by mouth daily, Disp: , Rfl:     Current Allergies     Allergies as of 04/28/2022    (No Known Allergies)            The following portions of the patient's history were reviewed and updated as appropriate: allergies, current medications, past family history, past medical history, past social history, past surgical history and problem list      Past Medical History:   Diagnosis Date    Anxiety     Pneumonia        Past Surgical History:   Procedure Laterality Date    NO PAST SURGERIES         Family History   Problem Relation Age of Onset    No Known Problems Mother     Farmer's esophagus Father     Colon cancer Father     Allergies Sister     Hyperthyroidism Maternal Grandmother     Hyperthyroidism Paternal Grandmother     Other Paternal Grandmother         CABG x4    Coronary artery disease Paternal Grandfather     Breast cancer Maternal Aunt     Bipolar disorder Maternal Uncle     Ovarian cancer Neg Hx     Uterine cancer Neg Hx     Cervical cancer Neg Hx          Medications have been verified  Objective   /90   Pulse 72   Temp (!) 97 3 °F (36 3 °C)   Resp 20   Ht 5' 3" (1 6 m)   Wt 61 2 kg (135 lb)   LMP 04/24/2022   SpO2 99%   BMI 23 91 kg/m²   Patient's last menstrual period was 04/24/2022  Physical Exam     Physical Exam  Constitutional:       Appearance: She is normal weight     HENT:      Right Ear: Tympanic membrane, ear canal and external ear normal       Left Ear: Tympanic membrane, ear canal and external ear normal  There is impacted cerumen  Ears:      Comments: Left TM was not seen until cerumen impaction was cleaned  Cardiovascular:      Rate and Rhythm: Normal rate and regular rhythm  Heart sounds: Normal heart sounds  Pulmonary:      Breath sounds: Normal breath sounds  No wheezing  Neurological:      General: No focal deficit present  Mental Status: She is alert and oriented to person, place, and time     Psychiatric:         Mood and Affect: Mood normal          Behavior: Behavior normal

## 2022-04-28 NOTE — PATIENT INSTRUCTIONS
Patient was educated on cerumen impaction  Patient was educated to keep ears clean and dry  Patient was told if symptoms persist follow up with ENT Or PCP  Cerumen Impaction   WHAT YOU NEED TO KNOW:   Cerumen impaction is the blockage of the outer ear canal by tightly packed cerumen (earwax)  It is generally treated with procedures such as flushing or suctioning the ear canal or the use of instruments to remove the impaction  DISCHARGE INSTRUCTIONS:   Medicines:  · Ear drops: These are used to soften the wax in your ear  Wax softening ear drops may be bought without a prescription  Ask your healthcare provider how often you should use this medicine  Read the instructions carefully before you use the ear drops  Do the following when you put in ear drops:     ? Warm the drops by holding the bottle in your hands for a few minutes  Cold ear drops may make you dizzy  ? Lie down with the affected ear toward the ceiling  You may also stand with your head tilted to one side  ? Pull your ear lobe up and back, and place the correct number of drops into the ear  ? Keep your ear facing up for 5 to 10 minutes so the drops coat the outer ear canal      ? Gently clean the outer part of the ear with a cotton swab  Do not  place the cotton swab or anything inside your ear canal  This increases the risk of damaging your eardrum  · Take your medicine as directed  Contact your healthcare provider if you think your medicine is not helping or if you have side effects  Tell him of her if you are allergic to any medicine  Keep a list of the medicines, vitamins, and herbs you take  Include the amounts, and when and why you take them  Bring the list or the pill bottles to follow-up visits  Carry your medicine list with you in case of an emergency  Follow up with your healthcare provider as directed:  Write down your questions so you remember to ask them during your visits     Contact your healthcare provider if: · You have a fever  · You have trouble hearing or ringing in your ear  · You have questions about your condition or care  Return to the emergency department if:   · You feel dizzy  · You have discharge or blood coming out of your ear  · Your ear pain does not go away or gets worse  © Copyright [employer] 2018 Information is for End User's use only and may not be sold, redistributed or otherwise used for commercial purposes  All illustrations and images included in CareNotes® are the copyrighted property of [employer]  or [facility] Reyes Acevedo   The above information is an  only  It is not intended as medical advice for individual conditions or treatments  Talk to your doctor, nurse or pharmacist before following any medical regimen to see if it is safe and effective for you